# Patient Record
Sex: FEMALE | Race: WHITE | NOT HISPANIC OR LATINO | Employment: FULL TIME | URBAN - METROPOLITAN AREA
[De-identification: names, ages, dates, MRNs, and addresses within clinical notes are randomized per-mention and may not be internally consistent; named-entity substitution may affect disease eponyms.]

---

## 2017-02-21 DIAGNOSIS — Z76.0 ENCOUNTER FOR MEDICATION REFILL: ICD-10-CM

## 2017-02-22 RX ORDER — ALPRAZOLAM 0.5 MG
TABLET ORAL
Qty: 90 TABLET | Refills: 0 | OUTPATIENT
Start: 2017-02-22

## 2017-03-09 ENCOUNTER — OFFICE VISIT (OUTPATIENT)
Dept: FAMILY MEDICINE | Facility: CLINIC | Age: 57
End: 2017-03-09

## 2017-03-09 VITALS
SYSTOLIC BLOOD PRESSURE: 130 MMHG | BODY MASS INDEX: 23.57 KG/M2 | HEART RATE: 87 BPM | OXYGEN SATURATION: 99 % | DIASTOLIC BLOOD PRESSURE: 68 MMHG | WEIGHT: 146 LBS

## 2017-03-09 DIAGNOSIS — E78.00 HYPERCHOLESTEREMIA: ICD-10-CM

## 2017-03-09 DIAGNOSIS — F41.9 ANXIETY: Primary | ICD-10-CM

## 2017-03-09 PROCEDURE — 99213 OFFICE O/P EST LOW 20 MIN: CPT | Performed by: FAMILY MEDICINE

## 2017-03-09 RX ORDER — ALPRAZOLAM 0.5 MG
TABLET ORAL
Qty: 90 TABLET | Refills: 5 | Status: SHIPPED | OUTPATIENT
Start: 2017-03-09 | End: 2017-11-19

## 2017-03-09 NOTE — PROGRESS NOTES
Been dealing with lots of anxiety over many years.  Needs refill to continue pretty stable lately    >15 min spent with patient, greater than 50% spent on discussion/education/planning, etc about The primary encounter diagnosis was Anxiety. A diagnosis of Hypercholesteremia was also pertinent to this visit.   JOAN

## 2017-03-09 NOTE — MR AVS SNAPSHOT
"              After Visit Summary   3/9/2017    Lisbeth Griffin    MRN: [id number]           Patient Information     Date Of Birth          1960        Visit Information        Provider Department      3/9/2017 12:00 PM Jean Horowitz MD [facility]        Today's Diagnoses     Anxiety    -  1    Hypercholesteremia           Follow-ups after your visit        Who to contact     If you have questions or need follow up information about today's clinic visit or your schedule please contact [facility] directly at [phone].  Normal or non-critical lab and imaging results will be communicated to you by InfraSearchhart, letter or phone within 4 business days after the clinic has received the results. If you do not hear from us within 7 days, please contact the clinic through Happiest Mindst or phone. If you have a critical or abnormal lab result, we will notify you by phone as soon as possible.  Submit refill requests through Caringo or call your pharmacy and they will forward the refill request to us. Please allow 3 business days for your refill to be completed.          Additional Information About Your Visit        MyChart Information     Caringo lets you send messages to your doctor, view your test results, renew your prescriptions, schedule appointments and more. To sign up, go to [email or web address] . Click on \"Log in\" on the left side of the screen, which will take you to the Welcome page. Then click on \"Sign up Now\" on the right side of the page.     You will be asked to enter the access code listed below, as well as some personal information. Please follow the directions to create your username and password.     Your access code is: [email or web address]  Expires: 2017  6:05 PM     Your access code will  in 90 days. If you need help or a new code, please call your [facility] or [phone].        Care EveryWhere ID     This is your Care EveryWhere ID. This could be " used by other organizations to access your [facility] medical records  [id number]        Your Vitals Were     Pulse Pulse Oximetry BMI (Body Mass Index)             87 99% 23.57 kg/m2          Blood Pressure from Last 3 Encounters:   03/09/17 130/68   11/30/16 133/73   02/23/16 118/70    Weight from Last 3 Encounters:   03/09/17 66.2 kg (146 lb)   11/30/16 62.1 kg (137 lb)   02/23/16 67.1 kg (148 lb)              Today, you had the following     No orders found for display         Where to get your medicines      Some of these will need a paper prescription and others can be bought over the counter.  Ask your nurse if you have questions.     Bring a paper prescription for each of these medications     ALPRAZolam 0.5 MG tablet          Primary Care Provider Office Phone # Fax #    Jean Horowitz [phone]       [facility] [address] [place] [address]        Thank you!     Thank you for choosing [facility]  for your care. Our goal is always to provide you with excellent care. Hearing back from our patients is one way we can continue to improve our services. Please take a few minutes to complete the written survey that you may receive in the mail after your visit with us. Thank you!             Your Updated Medication List - Protect others around you: Learn how to safely use, store and throw away your medicines at www.disposemymeds.org.          This list is accurate as of: 3/9/17 11:59 PM.  Always use your most recent med list.                   Brand Name Dispense Instructions for use    acyclovir 400 MG tablet    ZOVIRAX    180 tablet    Take 1 tablet (400 mg) by mouth 2 times daily       ALPRAZolam 0.5 MG tablet    XANAX    90 tablet    TAKE ONE TABLET BY MOUTH 3 TIMES DAILY AS NEEDED  FOR ANXIETY       imipramine 50 MG tablet    TOFRANIL    180 tablet    TAKE 2 TABLETS BY MOUTH  EVERY NIGHT AT BEDTIME       progesterone (in olive oil) 100 MG/ML CMPD  injection      Take 150 mg by mouth daily Pt says this is a pill not injection. Taking at night 150 mg

## 2017-03-10 ASSESSMENT — PATIENT HEALTH QUESTIONNAIRE - PHQ9: SUM OF ALL RESPONSES TO PHQ QUESTIONS 1-9: 2

## 2017-05-08 ENCOUNTER — HOSPITAL ENCOUNTER (OUTPATIENT)
Dept: MAMMOGRAPHY | Facility: CLINIC | Age: 57
Discharge: HOME | End: 2017-05-08
Attending: FAMILY MEDICINE | Admitting: FAMILY MEDICINE
Payer: COMMERCIAL

## 2017-05-08 DIAGNOSIS — Z12.31 VISIT FOR SCREENING MAMMOGRAM: ICD-10-CM

## 2017-05-08 PROCEDURE — 77063 BREAST TOMOSYNTHESIS BI: CPT

## 2017-05-22 DIAGNOSIS — Z76.0 ENCOUNTER FOR MEDICATION REFILL: ICD-10-CM

## 2017-05-22 RX ORDER — ACYCLOVIR 400 MG/1
400 TABLET ORAL 2 TIMES DAILY
Qty: 180 TABLET | Refills: 3 | Status: SHIPPED | OUTPATIENT
Start: 2017-05-22 | End: 2018-07-30

## 2017-08-21 ENCOUNTER — OFFICE VISIT (OUTPATIENT)
Dept: FAMILY MEDICINE | Facility: CLINIC | Age: 57
End: 2017-08-21

## 2017-08-21 VITALS
TEMPERATURE: 98.6 F | HEART RATE: 79 BPM | HEIGHT: 66 IN | OXYGEN SATURATION: 99 % | SYSTOLIC BLOOD PRESSURE: 102 MMHG | DIASTOLIC BLOOD PRESSURE: 70 MMHG | BODY MASS INDEX: 22.66 KG/M2 | WEIGHT: 141 LBS

## 2017-08-21 DIAGNOSIS — N64.4 NIPPLE PAIN: ICD-10-CM

## 2017-08-21 DIAGNOSIS — Z11.59 ENCOUNTER FOR HEPATITIS C SCREENING TEST FOR LOW RISK PATIENT: ICD-10-CM

## 2017-08-21 DIAGNOSIS — Z13.220 LIPID SCREENING: ICD-10-CM

## 2017-08-21 DIAGNOSIS — Z80.0 FAMILY HISTORY OF LYNCH SYNDROME: ICD-10-CM

## 2017-08-21 DIAGNOSIS — A60.04 HERPES SIMPLEX VULVOVAGINITIS: ICD-10-CM

## 2017-08-21 DIAGNOSIS — M21.612 BUNION, LEFT: ICD-10-CM

## 2017-08-21 DIAGNOSIS — Z80.0 FAMILY HISTORY OF RECTAL CANCER: ICD-10-CM

## 2017-08-21 DIAGNOSIS — Z00.00 ROUTINE GENERAL MEDICAL EXAMINATION AT A HEALTH CARE FACILITY: Primary | ICD-10-CM

## 2017-08-21 LAB
% GRANULOCYTES: 65 % (ref 42.2–75.2)
BACTERIA URINE: ABNORMAL
BILIRUB UR QL STRIP: 0
BLOOD URINE DIP: ABNORMAL
CASTS/LPF: 0
COLOR UR: YELLOW
CRYSTAL URINE: 0
EPITHELIAL CELLS - QUEST: ABNORMAL
GLUCOSE UR STRIP-MCNC: 0 MG/DL
HCT VFR BLD AUTO: 43 % (ref 35–46)
HEMOGLOBIN: 14.2 G/DL (ref 11.8–15.5)
KETONES UR QL STRIP: ABNORMAL
LEUKOCYTE ESTERASE URINE DIP: 0
LYMPHOCYTES NFR BLD AUTO: 28.5 % (ref 20.5–51.1)
MCH RBC QN AUTO: 30.7 PG (ref 27–31)
MCHC RBC AUTO-ENTMCNC: 33 G/DL (ref 33–37)
MCV RBC AUTO: 92.8 FL (ref 80–100)
MONOCYTES NFR BLD AUTO: 6.5 % (ref 1.7–9.3)
MUCOUS URINE: 0
NITRITE UR QL STRIP: ABNORMAL
PH UR STRIP: 5.5 PH (ref 5–9)
PLATELET # BLD AUTO: 277 K/UL (ref 140–450)
PROT UR QL: ABNORMAL MG/DL (ref ?–0.01)
RBC # BLD AUTO: 4.63 X10/CMM (ref 3.7–5.2)
RBC URINE: ABNORMAL (ref 0–3)
SP GR UR STRIP: 1.02 (ref 1–1.02)
UROBILINOGEN UR QL STRIP: 0.2 EU/DL (ref 0.2–1)
WBC # BLD AUTO: 5.6 X10/CMM (ref 3.8–11)
WBC URINE: 0 (ref 0–3)

## 2017-08-21 PROCEDURE — 85025 COMPLETE CBC W/AUTO DIFF WBC: CPT | Performed by: FAMILY MEDICINE

## 2017-08-21 PROCEDURE — 99396 PREV VISIT EST AGE 40-64: CPT | Performed by: FAMILY MEDICINE

## 2017-08-21 PROCEDURE — 86803 HEPATITIS C AB TEST: CPT | Mod: 90 | Performed by: FAMILY MEDICINE

## 2017-08-21 PROCEDURE — 80053 COMPREHEN METABOLIC PANEL: CPT | Mod: 90 | Performed by: FAMILY MEDICINE

## 2017-08-21 PROCEDURE — 81003 URINALYSIS AUTO W/O SCOPE: CPT | Performed by: FAMILY MEDICINE

## 2017-08-21 PROCEDURE — 36415 COLL VENOUS BLD VENIPUNCTURE: CPT | Performed by: FAMILY MEDICINE

## 2017-08-21 PROCEDURE — 80061 LIPID PANEL: CPT | Mod: 90 | Performed by: FAMILY MEDICINE

## 2017-08-21 RX ORDER — MULTIPLE VITAMINS W/ MINERALS TAB 9MG-400MCG
1 TAB ORAL DAILY
COMMUNITY

## 2017-08-21 ASSESSMENT — PATIENT HEALTH QUESTIONNAIRE - PHQ9: SUM OF ALL RESPONSES TO PHQ QUESTIONS 1-9: 8

## 2017-08-21 NOTE — PROGRESS NOTES
SUBJECTIVE:   CC: Lisbeth Griffin is an 56 year old woman who presents for preventive health visit.     Healthy Habits:    Do you get at least three servings of calcium containing foods daily (dairy, green leafy vegetables, etc.)? yes and no, taking calcium and/or vitamin D supplement    Amount of exercise or daily activities, outside of work: N/A    Problems taking medications regularly No    Medication side effects: No    Have you had an eye exam in the past two years? yes    Do you see a dentist twice per year? yes    Do you have sleep apnea, excessive snoring or daytime drowsiness?yes, snoring     Mammogram done on this date: 2017 (approximately), by this group: [facility], results were Normal.   Pap smear done on this date: 2016 (approximately), by this group: [facility], results were Normal.           CONCERNS  Acid reflux     Today's PHQ-2 Score:   PHQ-2 ( 1999 Pfizer) 8/21/2017 2/23/2016   Q1: Little interest or pleasure in doing things 1 0   Q2: Feeling down, depressed or hopeless 1 0   PHQ-2 Score 2 0         Abuse: Current or Past(Physical, Sexual or Emotional)- No  Do you feel safe in your environment - Yes  Social History   Substance Use Topics     Smoking status: Never Smoker     Smokeless tobacco: Never Used     Alcohol use Yes      Comment: 7 per week     The patient does not drink >3 drinks per day nor >7 drinks per week.    Reviewed orders with patient.  Reviewed health maintenance and updated orders accordingly - Yes  BP Readings from Last 3 Encounters:   08/21/17 102/70   03/09/17 130/68   11/30/16 133/73    Wt Readings from Last 3 Encounters:   08/21/17 64 kg (141 lb)   03/09/17 66.2 kg (146 lb)   11/30/16 62.1 kg (137 lb)                  Patient Active Problem List   Diagnosis     Anxiety     Acid indigestion     History of migraine     Family history of rectal cancer     Genital herpes     Family history of early CAD     Past Surgical History:   Procedure Laterality Date      COLONOSCOPY  11/21/2012    Procedure: COLONOSCOPY;;  Surgeon: Yamilex Baron MD;  Location:  GI     COLONOSCOPY N/A 11/26/2014    Procedure: COLONOSCOPY;  Surgeon: Yamilex Baron MD;  Location:  GI     COLONOSCOPY N/A 11/30/2016    Procedure: COMBINED COLONOSCOPY, SINGLE OR MULTIPLE BIOPSY/POLYPECTOMY BY BIOPSY;  Surgeon: Yamilex Baron MD;  Location:  GI     MAMMOPLASTY AUGMENTATION       uterine ablation         Social History   Substance Use Topics     Smoking status: Never Smoker     Smokeless tobacco: Never Used     Alcohol use Yes      Comment: 7 per week     Family History   Problem Relation Age of Onset     C.A.D. Maternal Grandfather 65     Colon Polyps Brother      Colon Cancer Sister      Orellana syndrome               Patient over age 50, mutual decision to screen reflected in health maintenance.      Pertinent mammograms are reviewed under the imaging tab.  History of abnormal Pap smear: NO - age 30- 65 PAP every 3 years recommended    Reviewed and updated as needed this visit by clinical staffTobacco  Allergies  Meds         Reviewed and updated as needed this visit by Provider        Past Medical History:   Diagnosis Date     Acid indigestion     Small hiatal hernia     Anxiety      Family history of Orellana syndrome     In her sister.  EGD unremarkable with the exception of a small hiatal hernia 2016     Genital herpes, unspecified      History of migraines       Past Surgical History:   Procedure Laterality Date     COLONOSCOPY  11/21/2012    Procedure: COLONOSCOPY;;  Surgeon: Yamilex Baron MD;  Location:  GI     COLONOSCOPY N/A 11/26/2014    Procedure: COLONOSCOPY;  Surgeon: Yamilex Baron MD;  Location:  GI     COLONOSCOPY N/A 11/30/2016    Procedure: COMBINED COLONOSCOPY, SINGLE OR MULTIPLE BIOPSY/POLYPECTOMY BY BIOPSY;  Surgeon: Yamilex Baron MD;  Location:  GI     MAMMOPLASTY AUGMENTATION       uterine ablation         ROS:  C: NEGATIVE for  "fever, chills, change in weight  I: NEGATIVE for worrisome rashes, moles or lesions  E: NEGATIVE for vision changes or irritation  ENT: NEGATIVE for ear, mouth and throat problems  R: NEGATIVE for significant cough or SOB  B: NEGATIVE for masses, tenderness or discharge  CV: NEGATIVE for chest pain, palpitations or peripheral edema  GI: NEGATIVE for nausea, abdominal pain, heartburn, or change in bowel habits  : NEGATIVE for unusual urinary or vaginal symptoms. No vaginal bleeding.  M: NEGATIVE for significant arthralgias or myalgia  N: NEGATIVE for weakness, dizziness or paresthesias  P: NEGATIVE for changes in mood or affect     OBJECTIVE:   /70  Pulse 79  Temp 98.6  F (37  C)  Ht 1.67 m (5' 5.75\")  Wt 64 kg (141 lb)  SpO2 99%  BMI 22.93 kg/m2  EXAM:  GENERAL APPEARANCE: healthy, alert and no distress  EYES: Eyes grossly normal to inspection, PERRL and conjunctivae and sclerae normal  HENT: ear canals and TM's normal, nose and mouth without ulcers or lesions, oropharynx clear and oral mucous membranes moist  NECK: no adenopathy, no asymmetry, masses, or scars and thyroid normal to palpation  RESP: lungs clear to auscultation - no rales, rhonchi or wheezes  BREAST: normal without masses, tenderness or nipple discharge and no palpable axillary masses or adenopathy  CV: regular rate and rhythm, normal S1 S2, no S3 or S4, no murmur, click or rub, no peripheral edema and peripheral pulses strong  ABDOMEN: soft, nontender, no hepatosplenomegaly, no masses and bowel sounds normal   (female): normal female external genitalia, normal urethral meatus, vaginal mucosal atrophy noted, normal cervix, adnexae, and uterus without masses or abnormal discharge  MS: no musculoskeletal defects are noted and gait is age appropriate without ataxia  SKIN: no suspicious lesions or rashes  NEURO: Normal strength and tone, sensory exam grossly normal, mentation intact and speech normal  PSYCH: mentation appears normal and " "affect normal/bright    ASSESSMENT/PLAN:   Lisbeth was seen today for physical and hearing screening.    Diagnoses and all orders for this visit:    Routine general medical examination at a health care facility  -     Comp. Metabolic Panel (14) ([facility])    Family history of rectal cancer    Herpes simplex vulvovaginitis    Nipple pain  -     CBC with Diff/Plt ([facility])    Bunion, left    Lipid screening  -     Lipid Panel ([facility])    Encounter for hepatitis C screening test for low risk patient  -     HCV Antibody ([facility])        COUNSELING:   Reviewed preventive health counseling, as reflected in patient instructions       Vision screening       Hearing screening    BP Screening:   Last 3 BP Readings:    BP Readings from Last 3 Encounters:   08/21/17 102/70   03/09/17 130/68   11/30/16 133/73     At this point she is not interested in being tested for Orellana syndrome though she is getting q2 year colonscopies  Her sister is positive.  Bimanual and breast exam is nrl.    The following was recommended to the patient:       reports that she has never smoked. She has never used smokeless tobacco.    Estimated body mass index is 22.93 kg/(m^2) as calculated from the following:    Height as of this encounter: 1.67 m (5' 5.75\").    Weight as of this encounter: 64 kg (141 lb).         Counseling Resources:  ATP IV Guidelines  Pooled Cohorts Equation Calculator  Breast Cancer Risk Calculator  FRAX Risk Assessment  ICSI Preventive Guidelines  Dietary Guidelines for Americans, 2010  USDA's MyPlate  ASA Prophylaxis  Lung CA Screening    Jean Horowitz MD  [facility]  "

## 2017-08-21 NOTE — LETTER
[facility] [address] [place]  [address]  Phone: [phone]    August 22, 2017      Lisbethsammie Dietz Roderick Griffin  [facility] [facility] [address]              Dear Lisbeth,    I am writing to report that your included test results are within expected ranges. I do not suggest that we make any changes at this time.          Sincerely,     Jean Horowitz M.D.    Results for orders placed or performed in visit on 08/21/17   Comp. Metabolic Panel (14) ([facility])   Result Value Ref Range    Glucose 111 (H) 65 - 99 mg/dL    Urea Nitrogen 16 6 - 24 mg/dL    Creatinine 1.07 (H) 0.57 - 1.00 mg/dL    eGFR If NonAfricn Am 58 (L) >59 mL/min/1.73    eGFR If Africn Am 67 >59 mL/min/1.73    BUN/Creatinine Ratio 15 9 - 23    Sodium 142 134 - 144 mmol/L    Potassium 4.3 3.5 - 5.2 mmol/L    Chloride 100 96 - 106 mmol/L    Total CO2 26 18 - 28 mmol/L    Calcium 9.6 8.7 - 10.2 mg/dL    Protein Total 7.8 6.0 - 8.5 g/dL    Albumin 4.8 3.5 - 5.5 g/dL    Globulin, Total 3.0 1.5 - 4.5 g/dL    A/G Ratio 1.6 1.2 - 2.2    Bilirubin Total 1.2 0.0 - 1.2 mg/dL    Alkaline Phosphatase 72 39 - 117 IU/L    AST 22 0 - 40 IU/L    ALT 17 0 - 32 IU/L    Narrative    Performed at:  01 - [facility] [address], [place], [place]  [id number]  : Jacob Maravilla MD, Phone:  [phone]   Lipid Panel ([facility])   Result Value Ref Range    Cholesterol 232 (H) 100 - 199 mg/dL    Triglycerides 79 0 - 149 mg/dL    HDL Cholesterol 54 >39 mg/dL    VLDL Cholesterol Luis 16 5 - 40 mg/dL    LDL Cholesterol Calculated 162 (H) 0 - 99 mg/dL    LDL/HDL Ratio 3.0 0.0 - 3.2 ratio units    Narrative    Performed at:  01 - [facility] [address], [place], [place]  [id number]  : Jacob Maravilla MD, Phone:  [phone]   CBC with Diff/Plt ([facility])   Result Value Ref Range    WBC x10/cmm 5.6 3.8 - 11.0 x10/cmm    % Lymphocytes 28.5 20.5 - 51.1 %    % Monocytes 6.5 1.7 - 9.3 %    % Granulocytes 65.0 42.2 - 75.2  %    RBC x10/cmm 4.63 3.7 - 5.2 x10/cmm    Hemoglobin 14.2 11.8 - 15.5 g/dl    Hematocrit 43.0 35 - 46 %    MCV 92.8 80 - 100 fL    MCH 30.7 27.0 - 31.0 pg    MCHC 33.0 33.0 - 37.0 g/dL    Platelet Count 277 140 - 450 K/uL   HCV Antibody ([facility])   Result Value Ref Range    Hep C Virus Ab <0.1 0.0 - 0.9 s/co ratio    Narrative    Performed at:  01 - [facility] [address], [place], [place]  [id number]  : Jacob Maravilla MD, Phone:  [phone]   Urinalysis w/reflex protein, bili ([facility])   Result Value Ref Range    Color Urine Yellow     pH Urine 5.5 5 - 9 pH    Specific Gravity Urine 1.025 1.005 - 1.025    Protein Urine Trace (A) 0.01 mg/dL    Glucose Urine 0     Ketones Urine 1+ (A)     Leukocyte Esterase Urine 0     Blood Urine Trace (A)     Nitrite Urine Neg NEG    Bilirubin Urine Dip 0     Urobilinogen Urine 0.2 0.2 - 1.0 EU/dL    WBC Urine 0 0 - 3    RBC Urine 0-3 0 - 3    Epithelial Cells Rare     Crystal Urine 0     Bacteria Urine Few     Mucous Urine 0     Casts/LPF 0

## 2017-08-21 NOTE — MR AVS SNAPSHOT
After Visit Summary   8/21/2017    Lisbeth Griffin    MRN: [id number]           Patient Information     Date Of Birth          1960        Visit Information        Provider Department      8/21/2017 9:00 AM Jean Horowitz MD [facility]        Today's Diagnoses     Routine general medical examination at a health care facility    -  1      Care Instructions      Preventive Health Recommendations  Female Ages 50 - 64    Yearly exam: See your health care provider every year in order to  o Review health changes.   o Discuss preventive care.    o Review your medicines if your doctor has prescribed any.      Get a Pap test every three years (unless you have an abnormal result and your provider advises testing more often).    If you get Pap tests with HPV test, you only need to test every 5 years, unless you have an abnormal result.     You do not need a Pap test if your uterus was removed (hysterectomy) and you have not had cancer.    You should be tested each year for STDs (sexually transmitted diseases) if you're at risk.     Have a mammogram every 1 to 2 years.    Have a colonoscopy at age 50, or have a yearly FIT test (stool test). These exams screen for colon cancer.      Have a cholesterol test every 5 years, or more often if advised.    Have a diabetes test (fasting glucose) every three years. If you are at risk for diabetes, you should have this test more often.     If you are at risk for osteoporosis (brittle bone disease), think about having a bone density scan (DEXA).    Shots: Get a flu shot each year. Get a tetanus shot every 10 years.    Nutrition:     Eat at least 5 servings of fruits and vegetables each day.    Eat whole-grain bread, whole-wheat pasta and brown rice instead of white grains and rice.    Talk to your provider about Calcium and Vitamin D.     Lifestyle    Exercise at least 150 minutes a week (30 minutes a day, 5 days a week). This will help you  "control your weight and prevent disease.    Limit alcohol to one drink per day.    No smoking.     Wear sunscreen to prevent skin cancer.     See your dentist every six months for an exam and cleaning.    See your eye doctor every 1 to 2 years.            Follow-ups after your visit        Who to contact     If you have questions or need follow up information about today's clinic visit or your schedule please contact [facility] directly at [phone].  Normal or non-critical lab and imaging results will be communicated to you by Artax Biopharmahart, letter or phone within 4 business days after the clinic has received the results. If you do not hear from us within 7 days, please contact the clinic through Nalat or phone. If you have a critical or abnormal lab result, we will notify you by phone as soon as possible.  Submit refill requests through PredictAd or call your pharmacy and they will forward the refill request to us. Please allow 3 business days for your refill to be completed.          Additional Information About Your Visit        PredictAd Information     PredictAd lets you send messages to your doctor, view your test results, renew your prescriptions, schedule appointments and more. To sign up, go to [email or web address] . Click on \"Log in\" on the left side of the screen, which will take you to the Welcome page. Then click on \"Sign up Now\" on the right side of the page.     You will be asked to enter the access code listed below, as well as some personal information. Please follow the directions to create your username and password.     Your access code is: [email or web address]  Expires: 2017  9:48 AM     Your access code will  in 90 days. If you need help or a new code, please call your [facility] clinic or [phone].        Care EveryWhere ID     This is your Care EveryWhere ID. This could be used by other organizations to access your [facility] medical records  [id number]        Your Vitals Were " "    Pulse Temperature Height Pulse Oximetry BMI (Body Mass Index)       79 98.6  F (37  C) 1.67 m (5' 5.75\") 99% 22.93 kg/m2        Blood Pressure from Last 3 Encounters:   08/21/17 102/70   03/09/17 130/68   11/30/16 133/73    Weight from Last 3 Encounters:   08/21/17 64 kg (141 lb)   03/09/17 66.2 kg (146 lb)   11/30/16 62.1 kg (137 lb)              Today, you had the following     No orders found for display       Primary Care Provider Office Phone # Fax #    Jean Horowitz [phone] [address]  [facility] [address]        Equal Access to Services     SUE STEARNS : Hadii jah leeo Somarc, waaxda luqadaha, qaybta kaalmada adeegyada, waxsanjuanita west hayradha rios . So [facility] [phone].    ATENCIÓN: Si habla español, tiene a acosta disposición servicios gratuitos de asistencia lingüística. Llame al [phone].    We comply with applicable federal civil rights laws and [place] laws. We do not discriminate on the basis of race, color, national origin, age, disability sex, sexual orientation or gender identity.            Thank you!     Thank you for choosing [facility]  for your care. Our goal is always to provide you with excellent care. Hearing back from our patients is one way we can continue to improve our services. Please take a few minutes to complete the written survey that you may receive in the mail after your visit with us. Thank you!             Your Updated Medication List - Protect others around you: Learn how to safely use, store and throw away your medicines at www.disposemymeds.org.          This list is accurate as of: 8/21/17  9:48 AM.  Always use your most recent med list.                   Brand Name Dispense Instructions for use Diagnosis    acyclovir 400 MG tablet    ZOVIRAX    180 tablet    Take 1 tablet (400 mg) by mouth 2 times daily    Encounter for medication refill       ALPRAZolam 0.5 MG tablet    XANAX    90 tablet "    TAKE ONE TABLET BY MOUTH 3 TIMES DAILY AS NEEDED  FOR ANXIETY    Anxiety       imipramine 50 MG tablet    TOFRANIL    180 tablet    TAKE 2 TABLETS BY MOUTH  EVERY NIGHT AT BEDTIME    Encounter for medication refill       Multi-vitamin Tabs tablet      Take 1 tablet by mouth daily        progesterone (in olive oil) 100 MG/ML CMPD injection      Take 150 mg by mouth daily Pt says this is a pill not injection. Taking at night 150 mg        UNABLE TO FIND      MEDICATION NAME: Vanecal

## 2017-08-22 LAB
ALBUMIN SERPL-MCNC: 4.8 G/DL (ref 3.5–5.5)
ALBUMIN/GLOB SERPL: 1.6 {RATIO} (ref 1.2–2.2)
ALP SERPL-CCNC: 72 IU/L (ref 39–117)
ALT SERPL-CCNC: 17 IU/L (ref 0–32)
AST SERPL-CCNC: 22 IU/L (ref 0–40)
BILIRUB SERPL-MCNC: 1.2 MG/DL (ref 0–1.2)
BUN SERPL-MCNC: 16 MG/DL (ref 6–24)
BUN/CREATININE RATIO: 15 (ref 9–23)
CALCIUM SERPL-MCNC: 9.6 MG/DL (ref 8.7–10.2)
CHLORIDE SERPLBLD-SCNC: 100 MMOL/L (ref 96–106)
CHOLEST SERPL-MCNC: 232 MG/DL (ref 100–199)
CREAT SERPL-MCNC: 1.07 MG/DL (ref 0.57–1)
EGFR IF AFRICN AM: 67 ML/MIN/1.73
EGFR IF NONAFRICN AM: 58 ML/MIN/1.73
GLOBULIN, TOTAL: 3 G/DL (ref 1.5–4.5)
GLUCOSE SERPL-MCNC: 111 MG/DL (ref 65–99)
HCV AB SERPL QL IA: <0.1 S/CO RATIO (ref 0–0.9)
HDLC SERPL-MCNC: 54 MG/DL
LDL/HDL RATIO: 3 RATIO UNITS (ref 0–3.2)
LDLC SERPL CALC-MCNC: 162 MG/DL (ref 0–99)
POTASSIUM SERPL-SCNC: 4.3 MMOL/L (ref 3.5–5.2)
PROT SERPL-MCNC: 7.8 G/DL (ref 6–8.5)
SODIUM SERPL-SCNC: 142 MMOL/L (ref 134–144)
TOTAL CO2: 26 MMOL/L (ref 18–28)
TRIGL SERPL-MCNC: 79 MG/DL (ref 0–149)
VLDLC SERPL CALC-MCNC: 16 MG/DL (ref 5–40)

## 2017-08-22 NOTE — PROGRESS NOTES
Dear Lisbeth,   I am writing to report that your included test results are within expected ranges. I do not suggest that we make any changes at this time.    Jean Horowitz M.D.

## 2017-10-13 ENCOUNTER — OFFICE VISIT (OUTPATIENT)
Dept: FAMILY MEDICINE | Facility: CLINIC | Age: 57
End: 2017-10-13

## 2017-10-13 VITALS
DIASTOLIC BLOOD PRESSURE: 80 MMHG | OXYGEN SATURATION: 99 % | HEIGHT: 65 IN | WEIGHT: 148 LBS | SYSTOLIC BLOOD PRESSURE: 118 MMHG | HEART RATE: 78 BPM | TEMPERATURE: 98.8 F | BODY MASS INDEX: 24.66 KG/M2

## 2017-10-13 DIAGNOSIS — Z01.818 PREOPERATIVE EXAMINATION: Primary | ICD-10-CM

## 2017-10-13 LAB
% GRANULOCYTES: 54.5 % (ref 42.2–75.2)
HCT VFR BLD AUTO: 40.3 % (ref 35–46)
HEMOGLOBIN: 13.6 G/DL (ref 11.8–15.5)
LYMPHOCYTES NFR BLD AUTO: 37.7 % (ref 20.5–51.1)
MCH RBC QN AUTO: 31.9 PG (ref 27–31)
MCHC RBC AUTO-ENTMCNC: 33.9 G/DL (ref 33–37)
MCV RBC AUTO: 94 FL (ref 80–100)
MONOCYTES NFR BLD AUTO: 7.8 % (ref 1.7–9.3)
PLATELET # BLD AUTO: 273 K/UL (ref 140–450)
RBC # BLD AUTO: 4.28 X10/CMM (ref 3.7–5.2)
WBC # BLD AUTO: 5.2 X10/CMM (ref 3.8–11)

## 2017-10-13 PROCEDURE — 36415 COLL VENOUS BLD VENIPUNCTURE: CPT | Performed by: FAMILY MEDICINE

## 2017-10-13 PROCEDURE — 85025 COMPLETE CBC W/AUTO DIFF WBC: CPT | Performed by: FAMILY MEDICINE

## 2017-10-13 PROCEDURE — 99215 OFFICE O/P EST HI 40 MIN: CPT | Performed by: FAMILY MEDICINE

## 2017-10-13 NOTE — MR AVS SNAPSHOT
"              After Visit Summary   10/13/2017    Lisbeth Griffin    MRN: [id number]           Patient Information     Date Of Birth          1960        Visit Information        Provider Department      10/13/2017 3:15 PM Mirtha Trejo MD [facility]        Today's Diagnoses     Preoperative examination    -  1       Follow-ups after your visit        Who to contact     If you have questions or need follow up information about today's clinic visit or your schedule please contact [facility] directly at [phone].  Normal or non-critical lab and imaging results will be communicated to you by Admittedlyhart, letter or phone within 4 business days after the clinic has received the results. If you do not hear from us within 7 days, please contact the clinic through [employer] or phone. If you have a critical or abnormal lab result, we will notify you by phone as soon as possible.  Submit refill requests through Powerlytics or call your pharmacy and they will forward the refill request to us. Please allow 3 business days for your refill to be completed.          Additional Information About Your Visit        MyChart Information     Powerlytics lets you send messages to your doctor, view your test results, renew your prescriptions, schedule appointments and more. To sign up, go to [email or web address] . Click on \"Log in\" on the left side of the screen, which will take you to the Welcome page. Then click on \"Sign up Now\" on the right side of the page.     You will be asked to enter the access code listed below, as well as some personal information. Please follow the directions to create your username and password.     Your access code is: [email or web address]  Expires: 2017  9:48 AM     Your access code will  in 90 days. If you need help or a new code, please call your [place] clinic or [phone].        Care EveryWhere ID     This is your Care EveryWhere ID. This could be " "used by other organizations to access your [place] medical records  [id number]        Your Vitals Were     Pulse Temperature Height Pulse Oximetry BMI (Body Mass Index)       78 98.8  F (37.1  C) 1.657 m (5' 5.25\") 99% 24.44 kg/m2        Blood Pressure from Last 3 Encounters:   10/13/17 118/80   08/21/17 102/70   03/09/17 130/68    Weight from Last 3 Encounters:   10/13/17 67.1 kg (148 lb)   08/21/17 64 kg (141 lb)   03/09/17 66.2 kg (146 lb)              We Performed the Following     CBC with Diff/Plt ([facility])        Primary Care Provider Office Phone # Fax #    Jean Horowitz [phone] [address]  [facility] [address]        Equal Access to Services     [facility]: Hadii aad ku hadasho Soomaali, waaxda luqadaha, qaybta kaalmada adeegyada, waxay cristiin hayalissan yazmin rios . So [facility] [phone].    ATENCIÓN: Si habla español, tiene a acosta disposición servicios gratuitos de asistencia lingüística. Luzmaheath al [phone].    We comply with applicable federal civil rights laws and [place] laws. We do not discriminate on the basis of race, color, national origin, age, disability, sex, sexual orientation, or gender identity.            Thank you!     Thank you for choosing [facility]  for your care. Our goal is always to provide you with excellent care. Hearing back from our patients is one way we can continue to improve our services. Please take a few minutes to complete the written survey that you may receive in the mail after your visit with us. Thank you!             Your Updated Medication List - Protect others around you: Learn how to safely use, store and throw away your medicines at www.disposemymeds.org.          This list is accurate as of: 10/13/17  3:58 PM.  Always use your most recent med list.                   Brand Name Dispense Instructions for use Diagnosis    acyclovir 400 MG tablet    ZOVIRAX    180 tablet    Take 1 tablet (400 mg) by " mouth 2 times daily    Encounter for medication refill       ALPRAZolam 0.5 MG tablet    XANAX    90 tablet    TAKE ONE TABLET BY MOUTH 3 TIMES DAILY AS NEEDED  FOR ANXIETY    Anxiety       imipramine 50 MG tablet    TOFRANIL    180 tablet    TAKE 2 TABLETS BY MOUTH  EVERY NIGHT AT BEDTIME    Encounter for medication refill       Multi-vitamin Tabs tablet      Take 1 tablet by mouth daily        progesterone (in olive oil) 100 MG/ML CMPD injection      Take 150 mg by mouth daily Pt says this is a pill not injection. Taking at night 150 mg        UNABLE TO FIND      MEDICATION NAME: Soleisacal

## 2017-10-13 NOTE — PROGRESS NOTES
[facility]  [address] [place] [id number] [phone]  Dept: [phone]    PRE-OP EVALUATION:  Today's date: 10/13/2017    Lisbeth Griffin (: 1960) presents for pre-operative evaluation assessment as requested by Dr. Gael Varghese.  She requires evaluation and anesthesia risk assessment prior to undergoing surgery/procedure for treatment of Nose .  Proposed procedure: Deviated Septum    Date of Surgery/ Procedure: 10/17/2017  Time of Surgery/ Procedure: 1PM  Hospital/Surgical Facility: [facility]  Fax number for surgical facility: [phone]  Primary Physician: Jean Horowitz  Type of Anesthesia Anticipated: to be determined    Patient has a Health Care Directive or Living Will:  NO    1. NO - Do you have a history of heart attack, stroke, stent, bypass or surgery on an artery in the head, neck, heart or legs?  2. YES - DO YOU EVER HAVE ANY PAIN OR DISCOMFORT IN YOUR CHEST? Anxiety, able to do 40 minutes of cardio without any chest discomfort.   3. NO - Do you have a history of  Heart Failure?  4. NO - Are you troubled by shortness of breath when: walking on the level, up a slight hill or at night?  5. NO - Do you currently have a cold, bronchitis or other respiratory infection?  6. NO - Do you have a cough, shortness of breath or wheezing?  7. NO - Do you sometimes get pains in the calves of your legs when you walk?  8. NO - Do you or anyone in your family have previous history of blood clots?  9. NO - Do you or does anyone in your family have a serious bleeding problem such as prolonged bleeding following surgeries or cuts?  10. NO - Have you ever had problems with anemia or been told to take iron pills?  11. YES - HAVE YOU HAD ANY ABNORMAL BLOOD LOSS SUCH AS BLACK, TARRY OR BLOODY STOOLS, OR ABNORMAL VAGINAL BLEEDING? Vaginal bleeding many years ago, post endometrial ablation.   12. NO - Have you ever had a blood transfusion?  13. YES -  HAVE YOU OR ANY OF YOUR RELATIVES EVER HAD PROBLEMS WITH ANESTHESIA? Self; nausea and takes a long to come out of anesthesia.   14. NO - Do you have sleep apnea, excessive snoring or daytime drowsiness?  15. NO - Do you have any prosthetic heart valves?  16. NO - Do you have prosthetic joints?  17. NO - Is there any chance that you may be pregnant?        HPI:                                                      Brief HPI related to upcoming procedure: history of snoring, but without any breathing difficulty, diagnosed with deviated septum.       PMH:  Anxiety: stable on imipramine, using xanax occasionally for anxiety and sleep issues.   Recurrent genital herpes: uses prn acyclovir.    MEDICAL HISTORY:                                                    Patient Active Problem List    Diagnosis Date Noted     Family history of Orellana syndrome 08/21/2017     Priority: Medium     Family history of early CAD 06/18/2014     Priority: Medium     Family history of rectal cancer 04/23/2012     Priority: Medium     Acid indigestion      Priority: Medium     History of migraine      Priority: Medium     (Problem list name updated by automated process. Provider to review and confirm.)       Genital herpes      Priority: Medium     Problem list name updated by automated process. Provider to review       Anxiety      Priority: Medium      Past Medical History:   Diagnosis Date     Acid indigestion     Small hiatal hernia     Anxiety      Family history of Orellana syndrome     In her sister.  EGD unremarkable with the exception of a small hiatal hernia 2016     Genital herpes, unspecified      History of migraines      Past Surgical History:   Procedure Laterality Date     COLONOSCOPY  11/21/2012    Procedure: COLONOSCOPY;;  Surgeon: Yamilex Baron MD;  Location:  GI     COLONOSCOPY N/A 11/26/2014    Procedure: COLONOSCOPY;  Surgeon: Yamilex Baron MD;  Location:  GI     COLONOSCOPY N/A 11/30/2016    Procedure:  COMBINED COLONOSCOPY, SINGLE OR MULTIPLE BIOPSY/POLYPECTOMY BY BIOPSY;  Surgeon: Yamilex Baron MD;  Location:  GI     MAMMOPLASTY AUGMENTATION       uterine ablation       Current Outpatient Prescriptions   Medication Sig Dispense Refill     UNABLE TO FIND MEDICATION NAME: Vivisacal       multivitamin, therapeutic with minerals (MULTI-VITAMIN) TABS tablet Take 1 tablet by mouth daily       acyclovir (ZOVIRAX) 400 MG tablet Take 1 tablet (400 mg) by mouth 2 times daily 180 tablet 3     ALPRAZolam (XANAX) 0.5 MG tablet TAKE ONE TABLET BY MOUTH 3 TIMES DAILY AS NEEDED  FOR ANXIETY 90 tablet 5     imipramine (TOFRANIL) 50 MG tablet TAKE 2 TABLETS BY MOUTH  EVERY NIGHT AT BEDTIME 180 tablet 3     progesterone, in olive oil, 100 MG/ML CMPD injection Take 150 mg by mouth daily Pt says this is a pill not injection. Taking at night 150 mg       OTC products: None, except as noted above    Allergies   Allergen Reactions     Codeine Sulfate       Latex Allergy: NO    Social History   Substance Use Topics     Smoking status: Never Smoker     Smokeless tobacco: Never Used     Alcohol use Yes      Comment: 7 per week     History   Drug Use No       REVIEW OF SYSTEMS:                                                    C: NEGATIVE for fever, chills, change in weight  I: NEGATIVE for worrisome rashes, moles or lesions  E: NEGATIVE for vision changes or irritation  E/M: NEGATIVE for ear, mouth and throat problems  R: NEGATIVE for significant cough or SOB  B: NEGATIVE for masses, tenderness or discharge  CV: NEGATIVE for chest pain, palpitations or peripheral edema  GI: POSITIVE for dyspepsia and associated cough due to reflux, but no shortness of breath or productive sputum  : NEGATIVE for frequency, dysuria, or hematuria  M: NEGATIVE for significant arthralgias or myalgia  N: NEGATIVE for weakness, dizziness or paresthesias  E: NEGATIVE for temperature intolerance, skin/hair changes  H: NEGATIVE for bleeding problems  P:  "NEGATIVE for changes in mood or affect    EXAM:                                                    /80  Pulse 78  Temp 98.8  F (37.1  C)  Ht 1.657 m (5' 5.25\")  Wt 67.1 kg (148 lb)  SpO2 99%  BMI 24.44 kg/m2    GENERAL APPEARANCE: healthy, alert and no distress     EYES: EOMI, PERRL     HENT: ear canals and TM's normal, oropharynx clear and deviated septum noted with smaller opening to right nare     NECK: no adenopathy, no asymmetry, masses, or scars and thyroid normal to palpation     RESP: lungs clear to auscultation - no rales, rhonchi or wheezes     CV: regular rates and rhythm, normal S1 S2, no S3 or S4 and no murmur, click or rub     ABDOMEN:  soft, nontender, no HSM or masses and bowel sounds normal     MS: extremities normal- no gross deformities noted, no evidence of inflammation in joints, FROM in all extremities.     SKIN: no suspicious lesions or rashes     NEURO: Normal strength and tone, sensory exam grossly normal, mentation intact and speech normal     PSYCH: mentation appears normal. and affect normal/bright     LYMPHATICS: No axillary, cervical, or supraclavicular nodes    DIAGNOSTICS:                                                      Labs Resulted Today:   Results for orders placed or performed in visit on 10/13/17   CBC with Diff/Plt ([facility])   Result Value Ref Range    WBC x10/cmm 5.2 3.8 - 11.0 x10/cmm    % Lymphocytes 37.7 20.5 - 51.1 %    % Monocytes 7.8 1.7 - 9.3 %    % Granulocytes 54.5 42.2 - 75.2 %    RBC x10/cmm 4.28 3.7 - 5.2 x10/cmm    Hemoglobin 13.6 11.8 - 15.5 g/dl    Hematocrit 40.3 35 - 46 %    MCV 94.0 80 - 100 fL    MCH 31.9 (A) 27.0 - 31.0 pg    MCHC 33.9 33.0 - 37.0 g/dL    Platelet Count 273 140 - 450 K/uL       Recent Labs   Lab Test  08/21/17   1023 08/21/17 02/23/16   1337   HGB   --   14.2   --    PLT   --   277   --    NA  142   --   142   POTASSIUM  4.3   --   4.7   CR  1.07*   --   0.90        IMPRESSION:                                                  "   Reason for surgery/procedure: snoring and deviated septum  Diagnosis/reason for consult: preoperative clearance    The proposed surgical procedure is considered INTERMEDIATE risk.    REVISED CARDIAC RISK INDEX  The patient has the following serious cardiovascular risks for perioperative complications such as (MI, PE, VFib and 3  AV Block):  No serious cardiac risks  INTERPRETATION: 0 risks: Class I (very low risk - 0.4% complication rate)    The patient has the following additional risks for perioperative complications:  No identified additional risks      ICD-10-CM    1. Preoperative examination Z01.818 CBC with Diff/Plt ([facility])       RECOMMENDATIONS:                                                              APPROVAL GIVEN to proceed with proposed procedure, without further diagnostic evaluation       Signed Electronically by: Mirtha Trejo MD    Copy of this evaluation report is provided to requesting physician.    [place] Preop Guidelines

## 2017-11-19 DIAGNOSIS — F41.9 ANXIETY: ICD-10-CM

## 2017-11-20 DIAGNOSIS — Z76.0 ENCOUNTER FOR MEDICATION REFILL: ICD-10-CM

## 2017-11-20 RX ORDER — ALPRAZOLAM 0.5 MG
TABLET ORAL
Qty: 90 TABLET | Refills: 5 | Status: SHIPPED | OUTPATIENT
Start: 2017-11-20 | End: 2018-07-30

## 2017-11-21 RX ORDER — IMIPRAMINE HCL 50 MG
TABLET ORAL
Qty: 180 TABLET | Refills: 0 | Status: SHIPPED | OUTPATIENT
Start: 2017-11-21 | End: 2018-03-26

## 2018-03-26 ENCOUNTER — OFFICE VISIT (OUTPATIENT)
Dept: FAMILY MEDICINE | Facility: CLINIC | Age: 58
End: 2018-03-26

## 2018-03-26 VITALS
WEIGHT: 141.6 LBS | BODY MASS INDEX: 22.76 KG/M2 | HEART RATE: 92 BPM | DIASTOLIC BLOOD PRESSURE: 58 MMHG | HEIGHT: 66 IN | SYSTOLIC BLOOD PRESSURE: 116 MMHG

## 2018-03-26 DIAGNOSIS — B35.1 DERMATOPHYTOSIS OF NAIL: ICD-10-CM

## 2018-03-26 DIAGNOSIS — F41.9 ANXIETY: Primary | ICD-10-CM

## 2018-03-26 PROCEDURE — 99213 OFFICE O/P EST LOW 20 MIN: CPT | Performed by: FAMILY MEDICINE

## 2018-03-26 RX ORDER — FERROUS SULFATE 325(65) MG
325 TABLET ORAL
COMMUNITY
End: 2023-05-30

## 2018-03-26 RX ORDER — IMIPRAMINE HCL 50 MG
TABLET ORAL
Qty: 180 TABLET | Refills: 3 | Status: SHIPPED | OUTPATIENT
Start: 2018-03-26 | End: 2019-05-13

## 2018-03-26 NOTE — MR AVS SNAPSHOT
"              After Visit Summary   3/26/2018    Lisbeth Griffin    MRN: [id number]           Patient Information     Date Of Birth          1960        Visit Information        Provider Department      3/26/2018 9:30 AM Jean Horowitz MD [facility]        Care Instructions    fu            Follow-ups after your visit        Your next 10 appointments already scheduled     Mar 26, 2018  9:30 AM CDT   SHORT with Jean Horowitz MD   [facility] ([facility])    [address] [place] [address] [phone]            Aug 24, 2018  8:30 AM CDT   PHYSICAL with Jean Horowitz MD   [facility] ([facility])    [address] [place] [address] [phone]              Who to contact     If you have questions or need follow up information about today's clinic visit or your schedule please contact [facility] directly at [phone].  Normal or non-critical lab and imaging results will be communicated to you by Standardized Safetyhart, letter or phone within 4 business days after the clinic has received the results. If you do not hear from us within 7 days, please contact the clinic through [employer] or phone. If you have a critical or abnormal lab result, we will notify you by phone as soon as possible.  Submit refill requests through Parudi or call your pharmacy and they will forward the refill request to us. Please allow 3 business days for your refill to be completed.          Additional Information About Your Visit        Parudi Information     Parudi lets you send messages to your doctor, view your test results, renew your prescriptions, schedule appointments and more. To sign up, go to [email or web address] . Click on \"Log in\" on the left side of the screen, which will take you to the Welcome page. Then click on \"Sign up Now\" on the right side of the page.     You will be asked to enter the " "access code listed below, as well as some personal information. Please follow the directions to create your username and password.     Your access code is: [email or web address]  Expires: 2018  9:20 AM     Your access code will  in 90 days. If you need help or a new code, please call your [place] clinic or [phone].        Care EveryWhere ID     This is your Care EveryWhere ID. This could be used by other organizations to access your [place] medical records  [id number]        Your Vitals Were     Pulse Height BMI (Body Mass Index)             92 1.676 m (5' 6\") 22.85 kg/m2          Blood Pressure from Last 3 Encounters:   18 116/58   10/13/17 118/80   17 102/70    Weight from Last 3 Encounters:   18 64.2 kg (141 lb 9.6 oz)   10/13/17 67.1 kg (148 lb)   17 64 kg (141 lb)              Today, you had the following     No orders found for display       Primary Care Provider Office Phone # Fax #    Jean Horowitz [phone] [address]  [facility] [address]        Equal Access to Services     [facility] : Hadii aad ku hadasho Soomaali, waaxda luqadaha, qaybta kaalmada adeegyada, waxay jenna hayradha rios . So [facility] [phone].    ATENCIÓN: Si habla español, tiene a acosta disposición servicios gratuitos de asistencia lingüística. Llame al [phone].    We comply with applicable federal civil rights laws and [place] laws. We do not discriminate on the basis of race, color, national origin, age, disability, sex, sexual orientation, or gender identity.            Thank you!     Thank you for choosing [facility]  for your care. Our goal is always to provide you with excellent care. Hearing back from our patients is one way we can continue to improve our services. Please take a few minutes to complete the written survey that you may receive in the mail after your visit with us. Thank you!             Your Updated Medication " List - Protect others around you: Learn how to safely use, store and throw away your medicines at www.disposemymeds.org.          This list is accurate as of 3/26/18  9:20 AM.  Always use your most recent med list.                   Brand Name Dispense Instructions for use Diagnosis    acyclovir 400 MG tablet    ZOVIRAX    180 tablet    Take 1 tablet (400 mg) by mouth 2 times daily    Encounter for medication refill       ALPRAZolam 0.5 MG tablet    XANAX    90 tablet    TAKE ONE TABLET BY MOUTH 3 TIMES DAILY AS NEEDED FOR ANXIETY    Anxiety       ferrous sulfate 325 (65 FE) MG tablet    IRON     Take 325 mg by mouth daily (with breakfast)        imipramine 50 MG tablet    TOFRANIL    180 tablet    TAKE 2 TABLETS BY MOUTH  EVERY NIGHT AT BEDTIME    Encounter for medication refill       Multi-vitamin Tabs tablet      Take 1 tablet by mouth daily        NONFORMULARY      Take 250 mg by mouth daily Progesterone        UNABLE TO FIND      Take by mouth 2 times daily MEDICATION NAME: Vivisacal - mixed supplement - hair supplement

## 2018-03-26 NOTE — PROGRESS NOTES
"Problem(s) Oriented visit        SUBJECTIVE:                                                    Lisbeth Griffin is a 57 year old female who presents to clinic today for the following health issues :    1. Anxiety  ANXIETY:  Has known history of anxiety and has been on medication for this.  The patient does not report any significant side effects of this medication.  The prior symptoms leading to the original diagnosis and decision to start medication therapy are better.     Appetite is stable.  Sleeping patterns are stable.    Overall, the level of \"racing thoughts\", emotional lability, and ease of agitation are better.    No recent panic attacks.    No flowsheet data found.      - imipramine (TOFRANIL) 50 MG tablet; TAKE 2 TABLETS BY MOUTH  EVERY NIGHT AT BEDTIME  Dispense: 180 tablet; Refill: 3    2. Dermatophytosis of nail  Long discussion         Problem list, Medication list, Allergies, and Medical/Social/Surgical histories reviewed in [facility] and updated as appropriate.   Additional history: as documented    ROS:  General and Resp. completed and negative except as noted above    Histories:   Patient Active Problem List   Diagnosis     Anxiety     Acid indigestion     History of migraine     Family history of rectal cancer     Genital herpes     Family history of early CAD     Family history of Orellana syndrome     Past Surgical History:   Procedure Laterality Date     COLONOSCOPY  11/21/2012    Procedure: COLONOSCOPY;;  Surgeon: Yamilex Baron MD;  Location:  GI     COLONOSCOPY N/A 11/26/2014    Procedure: COLONOSCOPY;  Surgeon: Yamilex Baron MD;  Location:  GI     COLONOSCOPY N/A 11/30/2016    Procedure: COMBINED COLONOSCOPY, SINGLE OR MULTIPLE BIOPSY/POLYPECTOMY BY BIOPSY;  Surgeon: Yamilex Baron MD;  Location:  GI     MAMMOPLASTY AUGMENTATION       uterine ablation         Social History   Substance Use Topics     Smoking status: Never Smoker     Smokeless tobacco: Never " "Used     Alcohol use Yes      Comment: 7 per week     Family History   Problem Relation Age of Onset     C.A.D. Maternal Grandfather 65     Colon Polyps Brother      Colon Cancer Sister      Orellana syndrome           OBJECTIVE:                                                    /58  Pulse 92  Ht 1.676 m (5' 6\")  Wt 64.2 kg (141 lb 9.6 oz)  BMI 22.85 kg/m2  Body mass index is 22.85 kg/(m^2).   APPEARANCE: = Relaxed and in no distress  SKIN: no suspicious lesions or rashes and fungus under multiple nails.     ASSESSMENT/PLAN:                                                        Lisbeth was seen today for nail problem.    Diagnoses and all orders for this visit:    Anxiety  -     imipramine (TOFRANIL) 50 MG tablet; TAKE 2 TABLETS BY MOUTH  EVERY NIGHT AT BEDTIME    Dermatophytosis of nail        Work on weight loss  Regular exercise    The following health maintenance items are reviewed in Epic and correct as of today:  Health Maintenance   Topic Date Due     ADVANCE DIRECTIVE PLANNING Q5 YRS  08/22/2015     INFLUENZA VACCINE (SYSTEM ASSIGNED)  09/01/2018     PAP SCREENING Q3 YR (SYSTEM ASSIGNED)  02/23/2019     MAMMO SCREEN Q2 YR (SYSTEM ASSIGNED)  05/08/2019     TETANUS IMMUNIZATION (SYSTEM ASSIGNED)  04/23/2022     LIPID SCREEN Q5 YR FEMALE (SYSTEM ASSIGNED)  08/21/2022     COLON CANCER SCREEN (SYSTEM ASSIGNED)  11/30/2026     HEPATITIS C SCREENING  Completed       Jean Horowitz MD  [facility]  Family Practice  [facility]  [phone]    For any issues my office # is [phone]        "

## 2018-08-24 ENCOUNTER — OFFICE VISIT (OUTPATIENT)
Dept: FAMILY MEDICINE | Facility: CLINIC | Age: 58
End: 2018-08-24

## 2018-08-24 VITALS
HEART RATE: 64 BPM | BODY MASS INDEX: 22.92 KG/M2 | WEIGHT: 142.6 LBS | DIASTOLIC BLOOD PRESSURE: 70 MMHG | SYSTOLIC BLOOD PRESSURE: 106 MMHG | RESPIRATION RATE: 12 BRPM | HEIGHT: 66 IN

## 2018-08-24 DIAGNOSIS — Z13.6 SCREENING FOR HEART DISEASE: ICD-10-CM

## 2018-08-24 DIAGNOSIS — F32.5 MAJOR DEPRESSION IN COMPLETE REMISSION (H): ICD-10-CM

## 2018-08-24 DIAGNOSIS — K21.9 GASTROESOPHAGEAL REFLUX DISEASE WITHOUT ESOPHAGITIS: ICD-10-CM

## 2018-08-24 DIAGNOSIS — Z00.00 ROUTINE GENERAL MEDICAL EXAMINATION AT A HEALTH CARE FACILITY: Primary | ICD-10-CM

## 2018-08-24 DIAGNOSIS — L65.9 HAIR LOSS: ICD-10-CM

## 2018-08-24 DIAGNOSIS — F43.21 ADJUSTMENT DISORDER WITH DEPRESSED MOOD: ICD-10-CM

## 2018-08-24 LAB
% GRANULOCYTES: 60.3 % (ref 42.2–75.2)
HCT VFR BLD AUTO: 40.9 % (ref 35–46)
HEMOGLOBIN: 13.6 G/DL (ref 11.8–15.5)
LYMPHOCYTES NFR BLD AUTO: 33.5 % (ref 20.5–51.1)
MCH RBC QN AUTO: 30.5 PG (ref 27–31)
MCHC RBC AUTO-ENTMCNC: 33.3 G/DL (ref 33–37)
MCV RBC AUTO: 91.6 FL (ref 80–100)
MONOCYTES NFR BLD AUTO: 6.2 % (ref 1.7–9.3)
PLATELET # BLD AUTO: 269 K/UL (ref 140–450)
RBC # BLD AUTO: 4.47 X10/CMM (ref 3.7–5.2)
WBC # BLD AUTO: 4.9 X10/CMM (ref 3.8–11)

## 2018-08-24 PROCEDURE — 36415 COLL VENOUS BLD VENIPUNCTURE: CPT | Performed by: FAMILY MEDICINE

## 2018-08-24 PROCEDURE — 99212 OFFICE O/P EST SF 10 MIN: CPT | Mod: 25 | Performed by: FAMILY MEDICINE

## 2018-08-24 PROCEDURE — 80050 GENERAL HEALTH PANEL: CPT | Mod: 90 | Performed by: FAMILY MEDICINE

## 2018-08-24 PROCEDURE — 99396 PREV VISIT EST AGE 40-64: CPT | Performed by: FAMILY MEDICINE

## 2018-08-24 PROCEDURE — 80061 LIPID PANEL: CPT | Mod: 90 | Performed by: FAMILY MEDICINE

## 2018-08-24 NOTE — MR AVS SNAPSHOT
After Visit Summary   8/24/2018    Lisbeth Vaughn    MRN: [id number]           Patient Information     Date Of Birth          1960        Visit Information        Provider Department      8/24/2018 8:30 AM Jean Horowitz MD [facility]        Today's Diagnoses     Routine general medical examination at a health care facility    -  1    Screening for heart disease        Gastroesophageal reflux disease without esophagitis        Adjustment disorder with depressed mood        Major depression in complete remission (H)        Hair loss          Care Instructions      Preventive Health Recommendations  Female Ages 50 - 64    Yearly exam: See your health care provider every year in order to  o Review health changes.   o Discuss preventive care.    o Review your medicines if your doctor has prescribed any.      Get a Pap test every three years (unless you have an abnormal result and your provider advises testing more often).    If you get Pap tests with HPV test, you only need to test every 5 years, unless you have an abnormal result.     You do not need a Pap test if your uterus was removed (hysterectomy) and you have not had cancer.    You should be tested each year for STDs (sexually transmitted diseases) if you're at risk.     Have a mammogram every 1 to 2 years.    Have a colonoscopy at age 50, or have a yearly FIT test (stool test). These exams screen for colon cancer.      Have a cholesterol test every 5 years, or more often if advised.    Have a diabetes test (fasting glucose) every three years. If you are at risk for diabetes, you should have this test more often.     If you are at risk for osteoporosis (brittle bone disease), think about having a bone density scan (DEXA).    Shots: Get a flu shot each year. Get a tetanus shot every 10 years.    Nutrition:     Eat at least 5 servings of fruits and vegetables each day.    Eat whole-grain bread, whole-wheat pasta and brown  rice instead of white grains and rice.    Get adequate Calcium and Vitamin D.     Lifestyle    Exercise at least 150 minutes a week (30 minutes a day, 5 days a week). This will help you control your weight and prevent disease.    Limit alcohol to one drink per day.    No smoking.     Wear sunscreen to prevent skin cancer.     See your dentist every six months for an exam and cleaning.    See your eye doctor every 1 to 2 years.            Follow-ups after your visit        Additional Services     PSYCHOLOGY REFERRAL       Your provider has referred you to:  Bindu Foster    Please be aware that coverage of these services is subject to the terms and limitations of your health insurance plan.  Call member services at your health plan with any benefit or coverage questions.      Please bring the following to your appointment:    >>   Any x-rays, CTs or MRIs which have been performed.  Contact the facility where they were done to arrange for  prior to your scheduled appointment.   >>   List of current medications   >>   This referral request   >>   Any documents/labs given to you for this referral                  Your next 10 appointments already scheduled     Dec 04, 2018   Procedure with Yamilex Baron MD   [facility] ([facility])    [address]  [place] [address] [phone]           [facility] is located at [address] [address]              Who to contact     If you have questions or need follow up information about today's clinic visit or your schedule please contact [facility] directly at [phone].  Normal or non-critical lab and imaging results will be communicated to you by MyChart, letter or phone within 4 business days after the clinic has received the results. If you do not hear from us within 7 days, please contact the clinic through MyChart or phone. If you have a critical or abnormal lab result, we will  "notify you by phone as soon as possible.  Submit refill requests through [employer] or call your pharmacy and they will forward the refill request to us. Please allow 3 business days for your refill to be completed.          Additional Information About Your Visit        Digitelhart Information     [employer] lets you send messages to your doctor, view your test results, renew your prescriptions, schedule appointments and more. To sign up, go to [email or web address] . Click on \"Log in\" on the left side of the screen, which will take you to the Welcome page. Then click on \"Sign up Now\" on the right side of the page.     You will be asked to enter the access code listed below, as well as some personal information. Please follow the directions to create your username and password.     Your access code is: [email or web address]  Expires: 2018  8:28 AM     Your access code will  in 90 days. If you need help or a new code, please call your [place] clinic or [phone].        Care EveryWhere ID     This is your Care EveryWhere ID. This could be used by other organizations to access your [place] medical records  [id number]        Your Vitals Were     Pulse Respirations Height Last Period BMI (Body Mass Index)       64 12 1.676 m (5' 6\") 2008 (Approximate) 23.02 kg/m2        Blood Pressure from Last 3 Encounters:   18 106/70   18 116/58   10/13/17 118/80    Weight from Last 3 Encounters:   18 64.7 kg (142 lb 9.6 oz)   18 64.2 kg (141 lb 9.6 oz)   10/13/17 67.1 kg (148 lb)              We Performed the Following     CBC with Diff/Plt ([facility])     Comp. Metabolic Panel (14) ([facility])     Lipid Panel ([facility])     PSYCHOLOGY REFERRAL     TSH ([facility])        Primary Care Provider Office Phone # Fax #    Jean Horowitz [phone] [address]  [facility] [address]        Equal Access to Services     MARLA STEARNS : brant Tamayo luqadaha, " fadyabelsarai milobryanaysha arceslavacharusanjuanita cristiin hayaan adeeg kharash la'aan ah. So [facility] [phone].    ATENCIÓN: Si hiral adhikari, tiene a acosta disposición servicios gratuitos de asistencia lingüística. August al [phone].    We comply with applicable federal civil rights laws and [place] laws. We do not discriminate on the basis of race, color, national origin, age, disability, sex, sexual orientation, or gender identity.            Thank you!     Thank you for choosing [facility]  for your care. Our goal is always to provide you with excellent care. Hearing back from our patients is one way we can continue to improve our services. Please take a few minutes to complete the written survey that you may receive in the mail after your visit with us. Thank you!             Your Updated Medication List - Protect others around you: Learn how to safely use, store and throw away your medicines at www.disposemymeds.org.          This list is accurate as of 8/24/18  9:31 AM.  Always use your most recent med list.                   Brand Name Dispense Instructions for use Diagnosis    acyclovir 400 MG tablet    ZOVIRAX    180 tablet    TAKE 1 TABLET (400 MG) BY MOUTH 2 TIMES DAILY    Encounter for medication refill       ALPRAZolam 0.5 MG tablet    XANAX    90 tablet    TAKE ONE TABLET BY MOUTH 3 TIMES DAILY AS NEEDED FOR ANXIETY    Anxiety       ferrous sulfate 325 (65 Fe) MG tablet    IRON     Take 325 mg by mouth daily (with breakfast)        imipramine 50 MG tablet    TOFRANIL    180 tablet    TAKE 2 TABLETS BY MOUTH  EVERY NIGHT AT BEDTIME    Anxiety       Multi-vitamin Tabs tablet      Take 1 tablet by mouth daily        NONFORMULARY      Take 250 mg by mouth daily Progesterone        UNABLE TO FIND      Take by mouth 2 times daily MEDICATION NAME: Vivisacal - mixed supplement - hair supplement

## 2018-08-24 NOTE — PROGRESS NOTES
SUBJECTIVE:   CC: Lisbeth Vaughn is an 58 year old woman who presents for preventive health visit.     Healthy Habits:    Do you get at least three servings of calcium containing foods daily (dairy, green leafy vegetables, etc.)? yes    Amount of exercise or daily activities, outside of work: 2 day(s) per week    Problems taking medications regularly No    Medication side effects: No    Have you had an eye exam in the past two years? yes    Do you see a dentist twice per year? yes    Do you have sleep apnea, excessive snoring or daytime drowsiness? no    Alopecia on the nape of the neck  Using viviscal for a couple of years.      Toe nail fungus - fingers & toes has been working to use applications    The patient complains of typical reflux symptoms: burning sensation after heavy meals, especially when lying down, sometimes with true waterbrash. Denies dysphagia, black or bloody stools or abdominal pain.    Depression:  Has known history of depression.  Has been on medication for this.  The patient does not report any significant side effects of this medication.  The prior symptoms leading to the original diagnosis and decision to start medication therapy are better.     Appetite is stable.  Sleeping patterns are stable.  No reported thoughts of suicide or homicide.    Last 3 PHQ9 results:  PHQ-9 SCORE 3/9/2017 8/21/2017 8/24/2018   Total Score 2 8 5       Hair loss,      Grief is tough with mom's loss.  Cough?- acid reflux    Today's PHQ-2 Score:   PHQ-2 ( 1999 Pf[employer]) 8/24/2018 8/21/2017   Q1: Little interest or pleasure in doing things 0 1   Q2: Feeling down, depressed or hopeless 1 1   PHQ-2 Score 1 2       Abuse: Current or Past(Physical, Sexual or Emotional)- No  Do you feel safe in your environment - Yes    Social History   Substance Use Topics     Smoking status: Never Smoker     Smokeless tobacco: Never Used     Alcohol use 3.0 - 4.2 oz/week     5 - 7 Glasses of wine per week     If you drink  alcohol do you typically have >3 drinks per day or >7 drinks per week? No                     Reviewed orders with patient.  Reviewed health maintenance and updated orders accordingly - Yes  Patient Active Problem List   Diagnosis     Anxiety     Acid indigestion     History of migraine     Family history of rectal cancer     Genital herpes     Family history of early CAD     Family history of Orellana syndrome     Past Surgical History:   Procedure Laterality Date     COLONOSCOPY  11/21/2012    Procedure: COLONOSCOPY;;  Surgeon: Yamilex Baron MD;  Location:  GI     COLONOSCOPY N/A 11/26/2014    Procedure: COLONOSCOPY;  Surgeon: Yamilex Baron MD;  Location:  GI     COLONOSCOPY N/A 11/30/2016    Procedure: COMBINED COLONOSCOPY, SINGLE OR MULTIPLE BIOPSY/POLYPECTOMY BY BIOPSY;  Surgeon: Yamilex Baron MD;  [facility]     MAMMOPLASTY AUGMENTATION       uterine ablation         Social History   Substance Use Topics     Smoking status: Never Smoker     Smokeless tobacco: Never Used     Alcohol use 3.0 - 4.2 oz/week     5 - 7 Glasses of wine per week     Family History   Problem Relation Age of Onset     C.A.D. Maternal Grandfather 65     Colon Polyps Brother      Colon Cancer Sister      Orellana syndrome           Patient over age 50, mutual decision to screen reflected in health maintenance.    Pertinent mammograms are reviewed under the imaging tab.  History of abnormal Pap smear: NO - age 30- 65 PAP every 3 years recommended  PAP / HPV Latest Ref Rng & Units 2/23/2016   HPV HIGH RISK Negative Positive(A)   HPV, LOW RISK Negative Negative     Reviewed and updated as needed this visit by clinical staff  Tobacco  Allergies  Meds         Reviewed and updated as needed this visit by Provider        Past Medical History:   Diagnosis Date     Acid indigestion     Small hiatal hernia     Anxiety      Family history of Orellana syndrome     In her sister.  EGD unremarkable with the exception of a  "small hiatal hernia 2016     Genital herpes, unspecified      History of migraines       Past Surgical History:   Procedure Laterality Date     COLONOSCOPY  11/21/2012    Procedure: COLONOSCOPY;;  Surgeon: Yamilex Baron MD;  Location:  GI     COLONOSCOPY N/A 11/26/2014    Procedure: COLONOSCOPY;  Surgeon: Yamilex Baron MD;  Location:  GI     COLONOSCOPY N/A 11/30/2016    Procedure: COMBINED COLONOSCOPY, SINGLE OR MULTIPLE BIOPSY/POLYPECTOMY BY BIOPSY;  Surgeon: Yamilex Baron MD;  Location:  GI     MAMMOPLASTY AUGMENTATION       uterine ablation         ROS:  CONSTITUTIONAL: NEGATIVE for fever, chills, change in weight  INTEGUMENTARY/SKIN: NEGATIVE for worrisome rashes, moles or lesions  EYES: NEGATIVE for vision changes or irritation  ENT: NEGATIVE for ear, mouth and throat problems  RESP: NEGATIVE for significant cough or SOB  BREAST: NEGATIVE for masses, tenderness or discharge  CV: NEGATIVE for chest pain, palpitations or peripheral edema  GI: NEGATIVE for nausea, abdominal pain, heartburn, or change in bowel habits  : NEGATIVE for unusual urinary or vaginal symptoms. No vaginal bleeding.  MUSCULOSKELETAL: NEGATIVE for significant arthralgias or myalgia  NEURO: NEGATIVE for weakness, dizziness or paresthesias  PSYCHIATRIC: NEGATIVE for changes in mood or affect     OBJECTIVE:   /70  Pulse 64  Resp 12  Ht 1.676 m (5' 6\")  Wt 64.7 kg (142 lb 9.6 oz)  LMP 08/24/2008 (Approximate)  BMI 23.02 kg/m2  EXAM:  GENERAL APPEARANCE: healthy, alert and no distress  EYES: Eyes grossly normal to inspection, PERRL and conjunctivae and sclerae normal  HENT: ear canals and TM's normal, nose and mouth without ulcers or lesions, oropharynx clear and oral mucous membranes moist  NECK: no adenopathy, no asymmetry, masses, or scars and thyroid normal to palpation  RESP: lungs clear to auscultation - no rales, rhonchi or wheezes  BREAST: normal without masses, tenderness or nipple discharge " "and no palpable axillary masses or adenopathy  CV: regular rate and rhythm, normal S1 S2, no S3 or S4, no murmur, click or rub, no peripheral edema and peripheral pulses strong  ABDOMEN: soft, nontender, no hepatosplenomegaly, no masses and bowel sounds normal  MS: no musculoskeletal defects are noted and gait is age appropriate without ataxia  SKIN: no suspicious lesions or rashes  NEURO: Normal strength and tone, sensory exam grossly normal, mentation intact and speech normal  PSYCH: mentation appears normal and affect normal/bright    Diagnostic Test Results:  none     ASSESSMENT/PLAN:   Lisbeth was seen today for physical and hearing screening.    Diagnoses and all orders for this visit:    Routine general medical examination at a health care facility    Screening for heart disease  -     Comp. Metabolic Panel (14) ([facility])  -     Lipid Panel ([facility])    Gastroesophageal reflux disease without esophagitis  -     CBC with Diff/Plt ([facility])    Adjustment disorder with depressed mood  -     PSYCHOLOGY REFERRAL    Major depression in complete remission (H)    Hair loss  -     TSH ([facility])        COUNSELING:   Reviewed preventive health counseling, as reflected in patient instructions       Regular exercise       Healthy diet/nutrition    BP Readings from Last 1 Encounters:   08/24/18 106/70     Estimated body mass index is 23.02 kg/(m^2) as calculated from the following:    Height as of this encounter: 1.676 m (5' 6\").    Weight as of this encounter: 64.7 kg (142 lb 9.6 oz).           reports that she has never smoked. She has never used smokeless tobacco.      Counseling Resources:  ATP IV Guidelines  Pooled Cohorts Equation Calculator  Breast Cancer Risk Calculator  FRAX Risk Assessment  ICSI Preventive Guidelines  Dietary Guidelines for Americans, 2010  USDA's MyPlate  ASA Prophylaxis  Lung CA Screening    Jean Horowitz MD  [facility]  "

## 2018-08-24 NOTE — LETTER
[facility] [address] [place]  [address]  Phone: [phone]    August 27, 2018      Lisbeth Vaughn  [address] [place] [address]              Dear Lisbeth,    I am writing to report that your included test results are within expected ranges. I do not suggest that we make any changes at this time.        Sincerely,     Jean Horowitz M.D.    Results for orders placed or performed in visit on 08/24/18   Comp. Metabolic Panel (14) ([facility])   Result Value Ref Range    Glucose 93 65 - 99 mg/dL    Urea Nitrogen 12 6 - 24 mg/dL    Creatinine 0.94 0.57 - 1.00 mg/dL    eGFR If NonAfricn Am 67 >59 mL/min/1.73    eGFR If Africn Am 77 >59 mL/min/1.73    BUN/Creatinine Ratio 13 9 - 23    Sodium 143 134 - 144 mmol/L    Potassium 4.4 3.5 - 5.2 mmol/L    Chloride 104 96 - 106 mmol/L    Total CO2 24 20 - 29 mmol/L    Calcium 9.4 8.7 - 10.2 mg/dL    Protein Total 7.3 6.0 - 8.5 g/dL    Albumin 4.5 3.5 - 5.5 g/dL    Globulin, Total 2.8 1.5 - 4.5 g/dL    A/G Ratio 1.6 1.2 - 2.2    Bilirubin Total 0.7 0.0 - 1.2 mg/dL    Alkaline Phosphatase 74 39 - 117 IU/L    AST 24 0 - 40 IU/L    ALT 21 0 - 32 IU/L    Narrative    Performed at:  01 - [facility] [address], [place], [place]  [id number]  : Jassi Oliva MD, Phone:  [phone]   CBC with Diff/Plt ([facility])   Result Value Ref Range    WBC x10/cmm 4.9 3.8 - 11.0 x10/cmm    % Lymphocytes 33.5 20.5 - 51.1 %    % Monocytes 6.2 1.7 - 9.3 %    % Granulocytes 60.3 42.2 - 75.2 %    RBC x10/cmm 4.47 3.7 - 5.2 x10/cmm    Hemoglobin 13.6 11.8 - 15.5 g/dl    Hematocrit 40.9 35 - 46 %    MCV 91.6 80 - 100 fL    MCH 30.5 27.0 - 31.0 pg    MCHC 33.3 33.0 - 37.0 g/dL    Platelet Count 269 140 - 450 K/uL   Lipid Panel ([facility])   Result Value Ref Range    Cholesterol 211 (H) 100 - 199 mg/dL    Triglycerides 72 0 - 149 mg/dL    HDL Cholesterol 54 >39 mg/dL    VLDL Cholesterol Luis 14 5 - 40 mg/dL    LDL Cholesterol Calculated 143 (H) 0 - 99 mg/dL     LDL/HDL Ratio 2.6 0.0 - 3.2 ratio    Narrative    Performed at:  01 - [facility] [address], [place], [place]  [id number]  : Jassi Oliva MD, Phone:  [phone]   TSH ([facility])   Result Value Ref Range    TSH 1.910 0.450 - 4.500 uIU/mL    Narrative    Performed at:  01 - [facility] [address], [place], [place]  [id number]  : Jassi Oliva MD, Phone:  [phone]

## 2018-08-25 LAB
ALBUMIN SERPL-MCNC: 4.5 G/DL (ref 3.5–5.5)
ALBUMIN/GLOB SERPL: 1.6 {RATIO} (ref 1.2–2.2)
ALP SERPL-CCNC: 74 IU/L (ref 39–117)
ALT SERPL-CCNC: 21 IU/L (ref 0–32)
AST SERPL-CCNC: 24 IU/L (ref 0–40)
BILIRUB SERPL-MCNC: 0.7 MG/DL (ref 0–1.2)
BUN SERPL-MCNC: 12 MG/DL (ref 6–24)
BUN/CREATININE RATIO: 13 (ref 9–23)
CALCIUM SERPL-MCNC: 9.4 MG/DL (ref 8.7–10.2)
CHLORIDE SERPLBLD-SCNC: 104 MMOL/L (ref 96–106)
CHOLEST SERPL-MCNC: 211 MG/DL (ref 100–199)
CREAT SERPL-MCNC: 0.94 MG/DL (ref 0.57–1)
EGFR IF AFRICN AM: 77 ML/MIN/1.73
EGFR IF NONAFRICN AM: 67 ML/MIN/1.73
GLOBULIN, TOTAL: 2.8 G/DL (ref 1.5–4.5)
GLUCOSE SERPL-MCNC: 93 MG/DL (ref 65–99)
HDLC SERPL-MCNC: 54 MG/DL
LDL/HDL RATIO: 2.6 RATIO (ref 0–3.2)
LDLC SERPL CALC-MCNC: 143 MG/DL (ref 0–99)
POTASSIUM SERPL-SCNC: 4.4 MMOL/L (ref 3.5–5.2)
PROT SERPL-MCNC: 7.3 G/DL (ref 6–8.5)
SODIUM SERPL-SCNC: 143 MMOL/L (ref 134–144)
TOTAL CO2: 24 MMOL/L (ref 20–29)
TRIGL SERPL-MCNC: 72 MG/DL (ref 0–149)
TSH BLD-ACNC: 1.91 UIU/ML (ref 0.45–4.5)
VLDLC SERPL CALC-MCNC: 14 MG/DL (ref 5–40)

## 2018-08-25 ASSESSMENT — PATIENT HEALTH QUESTIONNAIRE - PHQ9: SUM OF ALL RESPONSES TO PHQ QUESTIONS 1-9: 5

## 2018-11-06 DIAGNOSIS — F41.9 ANXIETY: ICD-10-CM

## 2018-11-06 RX ORDER — ALPRAZOLAM 0.5 MG
TABLET ORAL
Qty: 90 TABLET | Refills: 0 | Status: SHIPPED | OUTPATIENT
Start: 2018-11-06 | End: 2019-02-07

## 2018-12-04 ENCOUNTER — HOSPITAL ENCOUNTER (OUTPATIENT)
Facility: CLINIC | Age: 58
Discharge: HOME | End: 2018-12-04
Attending: COLON & RECTAL SURGERY | Admitting: COLON & RECTAL SURGERY
Payer: COMMERCIAL

## 2018-12-04 ENCOUNTER — SURGERY (OUTPATIENT)
Age: 58
End: 2018-12-04

## 2018-12-04 VITALS
DIASTOLIC BLOOD PRESSURE: 80 MMHG | WEIGHT: 142 LBS | BODY MASS INDEX: 22.82 KG/M2 | RESPIRATION RATE: 12 BRPM | OXYGEN SATURATION: 99 % | HEIGHT: 66 IN | SYSTOLIC BLOOD PRESSURE: 122 MMHG

## 2018-12-04 LAB — COLONOSCOPY: NORMAL

## 2018-12-04 PROCEDURE — G0500 MOD SEDAT ENDO SERVICE >5YRS: HCPCS | Performed by: COLON & RECTAL SURGERY

## 2018-12-04 PROCEDURE — G0105 COLORECTAL SCRN; HI RISK IND: HCPCS | Performed by: COLON & RECTAL SURGERY

## 2018-12-04 PROCEDURE — 25000128 H RX IP 250 OP 636: Performed by: COLON & RECTAL SURGERY

## 2018-12-04 PROCEDURE — 45378 DIAGNOSTIC COLONOSCOPY: CPT | Performed by: COLON & RECTAL SURGERY

## 2018-12-04 RX ORDER — ONDANSETRON 2 MG/ML
4 INJECTION INTRAMUSCULAR; INTRAVENOUS EVERY 6 HOURS PRN
Status: DISCONTINUED | OUTPATIENT
Start: 2018-12-04 | End: 2018-12-04 | Stop reason: HOSPADM

## 2018-12-04 RX ORDER — SODIUM CHLORIDE, SODIUM LACTATE, POTASSIUM CHLORIDE, CALCIUM CHLORIDE 600; 310; 30; 20 MG/100ML; MG/100ML; MG/100ML; MG/100ML
INJECTION, SOLUTION INTRAVENOUS CONTINUOUS PRN
Status: DISCONTINUED | OUTPATIENT
Start: 2018-12-04 | End: 2018-12-04 | Stop reason: HOSPADM

## 2018-12-04 RX ORDER — ONDANSETRON 2 MG/ML
4 INJECTION INTRAMUSCULAR; INTRAVENOUS
Status: DISCONTINUED | OUTPATIENT
Start: 2018-12-04 | End: 2018-12-04 | Stop reason: HOSPADM

## 2018-12-04 RX ORDER — LIDOCAINE 40 MG/G
CREAM TOPICAL
Status: DISCONTINUED | OUTPATIENT
Start: 2018-12-04 | End: 2018-12-04 | Stop reason: HOSPADM

## 2018-12-04 RX ORDER — ONDANSETRON 4 MG/1
4 TABLET, ORALLY DISINTEGRATING ORAL EVERY 6 HOURS PRN
Status: DISCONTINUED | OUTPATIENT
Start: 2018-12-04 | End: 2018-12-04 | Stop reason: HOSPADM

## 2018-12-04 RX ORDER — FENTANYL CITRATE 50 UG/ML
INJECTION, SOLUTION INTRAMUSCULAR; INTRAVENOUS PRN
Status: DISCONTINUED | OUTPATIENT
Start: 2018-12-04 | End: 2018-12-04 | Stop reason: HOSPADM

## 2018-12-04 RX ORDER — FLUMAZENIL 0.1 MG/ML
0.2 INJECTION, SOLUTION INTRAVENOUS
Status: DISCONTINUED | OUTPATIENT
Start: 2018-12-04 | End: 2018-12-04 | Stop reason: HOSPADM

## 2018-12-04 RX ORDER — NALOXONE HYDROCHLORIDE 0.4 MG/ML
.1-.4 INJECTION, SOLUTION INTRAMUSCULAR; INTRAVENOUS; SUBCUTANEOUS
Status: DISCONTINUED | OUTPATIENT
Start: 2018-12-04 | End: 2018-12-04 | Stop reason: HOSPADM

## 2018-12-04 RX ADMIN — MIDAZOLAM 2 MG: 1 INJECTION INTRAMUSCULAR; INTRAVENOUS at 09:30

## 2018-12-04 RX ADMIN — FENTANYL CITRATE 100 MCG: 50 INJECTION, SOLUTION INTRAMUSCULAR; INTRAVENOUS at 09:32

## 2018-12-04 RX ADMIN — SODIUM CHLORIDE, POTASSIUM CHLORIDE, SODIUM LACTATE AND CALCIUM CHLORIDE 150 ML/HR: 600; 310; 30; 20 INJECTION, SOLUTION INTRAVENOUS at 09:10

## 2018-12-04 NOTE — H&P
Pre-Endoscopy History and Physical     Lisbeth Vaughn MRN# [id number]   YOB: 1960 Age: 58 year old     Date of Procedure: 12/4/2018  Primary care provider: Jean Horowitz  Type of Endoscopy: colonoscopy  Reason for Procedure: screening  Type of Anesthesia Anticipated: moderate sedation    HPI:    Lisbeth is a 58 year old female who will be undergoing the above procedure.  Patient denies a change in her cassie habits or bleeding.  Patient has a history of adenomatous polyps; two sisters, with Orellana syndrome, have had colon  cancer and her brother has had polyps.     A history and physical has been performed. The patient's medications and allergies have been reviewed. The risks and benefits of the procedure and the sedation options and risks were discussed with the patient.  All questions were answered and informed consent was obtained.      She denies a personal or family history of anesthesia complications or bleeding disorders.   Prior to Admission medications    Medication Sig Start Date End Date Taking? Authorizing Provider   acyclovir (ZOVIRAX) 400 MG tablet TAKE 1 TABLET (400 MG) BY MOUTH 2 TIMES DAILY 7/31/18  Yes Mirtha Trejo MD   ALPRAZolam (XANAX) 0.5 MG tablet TAKE ONE TABLET BY MOUTH 3 TIMES DAILY AS NEEDED FOR ANXIETY 11/6/18  Yes Jean Horowitz MD   ferrous sulfate (IRON) 325 (65 FE) MG tablet Take 325 mg by mouth daily (with breakfast)   Yes Reported, Patient   multivitamin, therapeutic with minerals (MULTI-VITAMIN) TABS tablet Take 1 tablet by mouth daily   Yes Reported, Patient   Omega-3 Fatty Acids (FISH OIL OMEGA-3 PO)    Yes Reported, Patient   imipramine (TOFRANIL) 50 MG tablet TAKE 2 TABLETS BY MOUTH  EVERY NIGHT AT BEDTIME 3/26/18   Jean Horowitz MD   NONFORMULARY Take 250 mg by mouth daily Progesterone 11/19/17   Reported, Patient   UNABLE TO FIND Take by mouth 2 times daily MEDICATION NAME: Vivisacal - mixed supplement - hair supplement     "Reported, Patient       Allergies   Allergen Reactions     Codeine Sulfate         Current Facility-Administered Medications   Medication     lidocaine (LMX4) cream     lidocaine 1 % 1 mL     ondansetron (ZOFRAN) injection 4 mg     sodium chloride (PF) 0.9% PF flush 3 mL     sodium chloride (PF) 0.9% PF flush 3 mL       Patient Active Problem List   Diagnosis     Anxiety     Acid indigestion     History of migraine     Family history of rectal cancer     Genital herpes     Family history of early CAD     Family history of Orellana syndrome        Past Medical History:   Diagnosis Date     Acid indigestion     Small hiatal hernia     Anxiety      Family history of Orellana syndrome     In her sister.  EGD unremarkable with the exception of a small hiatal hernia 2016     Genital herpes, unspecified      History of migraines         Past Surgical History:   Procedure Laterality Date     COLONOSCOPY  11/21/2012    Procedure: COLONOSCOPY;;  Surgeon: Yamilex Baron MD;  Location:  GI     COLONOSCOPY N/A 11/26/2014    Procedure: COLONOSCOPY;  Surgeon: Yamilex Baron MD;  Location:  GI     COLONOSCOPY N/A 11/30/2016    Procedure: COMBINED COLONOSCOPY, SINGLE OR MULTIPLE BIOPSY/POLYPECTOMY BY BIOPSY;  Surgeon: Yamilex Baron MD;  Location:  GI     MAMMOPLASTY AUGMENTATION       uterine ablation         Social History   Substance Use Topics     Smoking status: Never Smoker     Smokeless tobacco: Never Used     Alcohol use 3.0 - 4.2 oz/week     5 - 7 Glasses of wine per week       Family History   Problem Relation Age of Onset     Colon Cancer Sister      Orellana syndrome     C.A.D. Maternal Grandfather 65     Colon Polyps Brother        REVIEW OF SYSTEMS:     5 point ROS negative except as noted above in HPI, including Gen., Resp., CV, GI &  system review.      PHYSICAL EXAM:   /74  Resp 16  Ht 1.676 m (5' 6\")  Wt 64.4 kg (142 lb)  LMP 08/24/2008 (Approximate)  SpO2 99%  BMI 22.92 kg/m2 " "Estimated body mass index is 22.92 kg/(m^2) as calculated from the following:    Height as of this encounter: 1.676 m (5' 6\").    Weight as of this encounter: 64.4 kg (142 lb).   GENERAL APPEARANCE: healthy  MENTAL STATUS: alert  AIRWAY EXAM: Mallampatti Class I (visualization of the soft palate, fauces, uvula, anterior and posterior pillars)  RESP: lungs clear to auscultation - no rales, rhonchi or wheezes  CV: regular rates and rhythm      DIAGNOSTICS:    Not indicated      IMPRESSION   ASA Class 2 - Mild systemic disease        PLAN:       Colonoscopy with possible polypectomy, possible biopsy. The indications, procedure and risks were explained to the patient who agrees to proceed.       The above has been forwarded to the consulting provider.      Signed Electronically by: Yamilex Baron  December 4, 2018        "

## 2018-12-04 NOTE — BRIEF OP NOTE
[facility] Brief Operative Note    Pre-operative diagnosis: FAMILY HISTORY OF COLONIC POLYPS   Post-operative diagnosis diverticulosis     Procedure: Procedure(s):  COLONOSCOPY   Surgeon(s): Surgeon(s) and Role:     * Yamilex Baron MD - Primary   Estimated blood loss: * No values recorded between 12/4/2018 12:00 AM and 12/4/2018  9:55 AM *    Specimens: * No specimens in log *   Findings: See Provation procedure note in Epic

## 2019-02-07 DIAGNOSIS — F41.9 ANXIETY: ICD-10-CM

## 2019-02-07 RX ORDER — ALPRAZOLAM 0.5 MG
TABLET ORAL
Qty: 30 TABLET | Refills: 0 | Status: SHIPPED | OUTPATIENT
Start: 2019-02-07 | End: 2019-03-05

## 2019-03-05 DIAGNOSIS — F41.9 ANXIETY: ICD-10-CM

## 2019-03-07 RX ORDER — ALPRAZOLAM 0.5 MG
TABLET ORAL
Qty: 30 TABLET | Refills: 0 | Status: SHIPPED | OUTPATIENT
Start: 2019-03-07 | End: 2019-05-13

## 2019-05-13 DIAGNOSIS — F41.9 ANXIETY: ICD-10-CM

## 2019-05-13 RX ORDER — ALPRAZOLAM 0.5 MG
TABLET ORAL
Qty: 30 TABLET | Refills: 0 | Status: SHIPPED | OUTPATIENT
Start: 2019-05-13 | End: 2019-06-28

## 2019-05-13 RX ORDER — IMIPRAMINE HCL 50 MG
TABLET ORAL
Qty: 180 TABLET | Refills: 0 | Status: SHIPPED | OUTPATIENT
Start: 2019-05-13 | End: 2019-08-12

## 2019-05-13 NOTE — TELEPHONE ENCOUNTER
IMIPRAMINE & ALPRAZOLAM  Last OV 8/24/18  Last PHQ-9 8/24/18    PHQ-9 SCORE 3/9/2017 8/21/2017 8/24/2018   PHQ-9 Total Score 2 8 5

## 2019-06-28 DIAGNOSIS — F41.9 ANXIETY: ICD-10-CM

## 2019-06-28 DIAGNOSIS — Z76.0 ENCOUNTER FOR MEDICATION REFILL: ICD-10-CM

## 2019-06-28 RX ORDER — ACYCLOVIR 400 MG/1
TABLET ORAL
Qty: 180 TABLET | Refills: 1 | Status: SHIPPED | OUTPATIENT
Start: 2019-06-28 | End: 2020-09-28

## 2019-06-28 RX ORDER — ALPRAZOLAM 0.5 MG
TABLET ORAL
Qty: 30 TABLET | Refills: 0 | Status: SHIPPED | OUTPATIENT
Start: 2019-06-28 | End: 2019-07-31

## 2019-06-28 NOTE — TELEPHONE ENCOUNTER
[place] OV 8/24/18 with Dr. Horowitz, future CPX scheduled 9/27/19. No contract or toxassure on file. [place]  checked last fills:  ALPRAZolam       Dispensed Days Supply Quantity Provider Pharmacy   ALPRAZOLAM 0.5 MG TABLET 05/14/2019 10 30 each [facility]...   ALPRAZOLAM 0 5 MG TABLET 03/07/2019 10 30 each [facility]...   ALPRAZOLAM 0 5 MG TABLET 02/07/2019 10 30 each [facility]...   ALPRAZOLAM 0 5 MG TABLET 11/07/2018 30 90 each [facility]...   ALPRAZOLAM 0 5 MG TABLET 07/31/2018 30 90 each [facility]...        Routed to Dr James-on call MD for review. Jacquelyn Herman

## 2019-07-31 DIAGNOSIS — F41.9 ANXIETY: ICD-10-CM

## 2019-07-31 NOTE — TELEPHONE ENCOUNTER
Alprazolam refill requested.     Last visit: 8/24/18 with Dr. Horowitz   Future appt: 9/27/19 CPX with Dr. Horowitz   Controlled Substance Agreement: none on file     ALPRAZolam       Dispensed Days Supply Quantity Provider Pharmacy   ALPRAZOLAM 0.5 MG TABLET 06/28/2019 10 30 each ESTEFANI QUILES [facility] - B[facility]...   ALPRAZOLAM 0.5 MG TABLET 05/14/2019 10 30 each JAUN HOROWITZ [facility] - BLO...   ALPRAZOLAM 0 5 MG TABLET 03/07/2019 10 30 each JAUN HOROWITZ [facility] - BLO...   ALPRAZOLAM 0 5 MG TABLET 02/07/2019 10 30 each JAUN HOROWITZ [facility] - BLO...   ALPRAZOLAM 0 5 MG TABLET 11/07/2018 30 90 each MARCOS PRATT [facility] - BLO...        Plan: routed to Dr. Horowitz for review 8/1/19 when back in office.  Judi Foster RN

## 2019-07-31 NOTE — TELEPHONE ENCOUNTER
Refill medication Xanax    LOV 08/24/2018  Labs 08/24/2018    Lexie Sunshine MA on 7/31/2019 at 9:25 AM

## 2019-08-01 RX ORDER — ALPRAZOLAM 0.5 MG
TABLET ORAL
Qty: 30 TABLET | Refills: 0 | Status: SHIPPED | OUTPATIENT
Start: 2019-08-01 | End: 2019-09-02

## 2019-08-12 DIAGNOSIS — F41.9 ANXIETY: ICD-10-CM

## 2019-08-12 RX ORDER — IMIPRAMINE HCL 50 MG
TABLET ORAL
Qty: 180 TABLET | Refills: 0 | Status: SHIPPED | OUTPATIENT
Start: 2019-08-12 | End: 2019-09-27

## 2019-08-12 NOTE — TELEPHONE ENCOUNTER
Refill medication Imipramine    LOV 08/24/2018  Labs 08/24/2018    Lexie Sunshine MA on 8/12/2019 at 10:18 AM

## 2019-09-02 DIAGNOSIS — F41.9 ANXIETY: ICD-10-CM

## 2019-09-03 RX ORDER — ALPRAZOLAM 0.5 MG
TABLET ORAL
Qty: 30 TABLET | Refills: 0 | Status: SHIPPED | OUTPATIENT
Start: 2019-09-03 | End: 2019-09-04

## 2019-09-04 DIAGNOSIS — F41.9 ANXIETY: ICD-10-CM

## 2019-09-05 RX ORDER — ALPRAZOLAM 0.5 MG
TABLET ORAL
Qty: 30 TABLET | Refills: 0 | Status: SHIPPED | OUTPATIENT
Start: 2019-09-05 | End: 2019-09-27

## 2019-09-27 ENCOUNTER — OFFICE VISIT (OUTPATIENT)
Dept: FAMILY MEDICINE | Facility: CLINIC | Age: 59
End: 2019-09-27

## 2019-09-27 VITALS
HEIGHT: 66 IN | RESPIRATION RATE: 16 BRPM | OXYGEN SATURATION: 97 % | SYSTOLIC BLOOD PRESSURE: 128 MMHG | HEART RATE: 81 BPM | BODY MASS INDEX: 23.63 KG/M2 | WEIGHT: 147 LBS | DIASTOLIC BLOOD PRESSURE: 82 MMHG

## 2019-09-27 DIAGNOSIS — Z13.6 SCREENING FOR HEART DISEASE: ICD-10-CM

## 2019-09-27 DIAGNOSIS — Z00.00 ROUTINE GENERAL MEDICAL EXAMINATION AT A HEALTH CARE FACILITY: Primary | ICD-10-CM

## 2019-09-27 DIAGNOSIS — Z12.31 ENCOUNTER FOR SCREENING MAMMOGRAM FOR BREAST CANCER: ICD-10-CM

## 2019-09-27 DIAGNOSIS — Z80.41 FAMILY HISTORY OF OVARIAN CANCER: ICD-10-CM

## 2019-09-27 DIAGNOSIS — K30 ACID INDIGESTION: ICD-10-CM

## 2019-09-27 DIAGNOSIS — B97.7 HPV IN FEMALE: ICD-10-CM

## 2019-09-27 DIAGNOSIS — F32.5 MAJOR DEPRESSION IN COMPLETE REMISSION (H): ICD-10-CM

## 2019-09-27 DIAGNOSIS — A60.04 HERPES SIMPLEX VULVOVAGINITIS: ICD-10-CM

## 2019-09-27 DIAGNOSIS — F41.9 ANXIETY: ICD-10-CM

## 2019-09-27 PROCEDURE — 36415 COLL VENOUS BLD VENIPUNCTURE: CPT | Performed by: FAMILY MEDICINE

## 2019-09-27 PROCEDURE — 99396 PREV VISIT EST AGE 40-64: CPT | Performed by: FAMILY MEDICINE

## 2019-09-27 PROCEDURE — 80061 LIPID PANEL: CPT | Mod: 90 | Performed by: FAMILY MEDICINE

## 2019-09-27 PROCEDURE — 80053 COMPREHEN METABOLIC PANEL: CPT | Mod: 90 | Performed by: FAMILY MEDICINE

## 2019-09-27 PROCEDURE — 99213 OFFICE O/P EST LOW 20 MIN: CPT | Mod: 25 | Performed by: FAMILY MEDICINE

## 2019-09-27 RX ORDER — ALPRAZOLAM 0.5 MG
0.5 TABLET ORAL 3 TIMES DAILY PRN
Qty: 30 TABLET | Refills: 3 | Status: SHIPPED | OUTPATIENT
Start: 2019-09-27 | End: 2020-02-04

## 2019-09-27 RX ORDER — IMIPRAMINE HCL 50 MG
TABLET ORAL
Qty: 180 TABLET | Refills: 3 | Status: SHIPPED | OUTPATIENT
Start: 2019-09-27 | End: 2020-09-28

## 2019-09-27 RX ORDER — CICLOPIROX 80 MG/ML
SOLUTION TOPICAL
Refills: 3 | COMMUNITY
Start: 2019-08-12 | End: 2021-10-01 | Stop reason: ALTCHOICE

## 2019-09-27 ASSESSMENT — ANXIETY QUESTIONNAIRES
7. FEELING AFRAID AS IF SOMETHING AWFUL MIGHT HAPPEN: SEVERAL DAYS
2. NOT BEING ABLE TO STOP OR CONTROL WORRYING: NOT AT ALL
IF YOU CHECKED OFF ANY PROBLEMS ON THIS QUESTIONNAIRE, HOW DIFFICULT HAVE THESE PROBLEMS MADE IT FOR YOU TO DO YOUR WORK, TAKE CARE OF THINGS AT HOME, OR GET ALONG WITH OTHER PEOPLE: NOT DIFFICULT AT ALL
1. FEELING NERVOUS, ANXIOUS, OR ON EDGE: SEVERAL DAYS
GAD7 TOTAL SCORE: 3
3. WORRYING TOO MUCH ABOUT DIFFERENT THINGS: SEVERAL DAYS
5. BEING SO RESTLESS THAT IT IS HARD TO SIT STILL: NOT AT ALL
6. BECOMING EASILY ANNOYED OR IRRITABLE: NOT AT ALL

## 2019-09-27 ASSESSMENT — PATIENT HEALTH QUESTIONNAIRE - PHQ9
5. POOR APPETITE OR OVEREATING: NOT AT ALL
SUM OF ALL RESPONSES TO PHQ QUESTIONS 1-9: 8

## 2019-09-27 ASSESSMENT — MIFFLIN-ST. JEOR: SCORE: 1250.6

## 2019-09-27 NOTE — LETTER
My Depression Action Plan  Name: Lisbeth Vaughn   Date of Birth 1960  Date: 9/27/2019    My doctor: Jean Horowitz   My clinic: [facility] [address] [place] [address] [phone]          GREEN    ZONE   Good Control    What it looks like:     Things are going generally well. You have normal up s and down s. You may even feel depressed from time to time, but bad moods usually last less than a day.   What you need to do:  1. Continue to care for yourself (see self care plan)  2. Check your depression survival kit and update it as needed  3. Follow your physician s recommendations including any medication.  4. Do not stop taking medication unless you consult with your physician first.           YELLOW         ZONE Getting Worse    What it looks like:     Depression is starting to interfere with your life.     It may be hard to get out of bed; you may be starting to isolate yourself from others.    Symptoms of depression are starting to last most all day and this has happened for several days.     You may have suicidal thoughts but they are not constant.   What you need to do:     1. Call your care team, your response to treatment will improve if you keep your care team informed of your progress. Yellow periods are signs an adjustment may need to be made.     2. Continue your self-care, even if you have to fake it!    3. Talk to someone in your support network    4. Open up your depression survival kit           RED    ZONE Medical Alert - Get Help    What it looks like:     Depression is seriously interfering with your life.     You may experience these or other symptoms: You can t get out of bed most days, can t work or engage in other necessary activities, you have trouble taking care of basic hygiene, or basic responsibilities, thoughts of suicide or death that will not go away, self-injurious behavior.     What you need to do:  1. Call your care  team and request a same-day appointment. If they are not available (weekends or after hours) call your local crisis line, emergency room or 911.            Depression Self Care Plan / Survival Kit    Self-Care for Depression  Here s the deal. Your body and mind are really not as separate as most people think.  What you do and think affects how you feel and how you feel influences what you do and think. This means if you do things that people who feel good do, it will help you feel better.  Sometimes this is all it takes.  There is also a place for medication and therapy depending on how severe your depression is, so be sure to consult with your medical provider and/ or Behavioral Health Consultant if your symptoms are worsening or not improving.     In order to better manage my stress, I will:    Exercise  Get some form of exercise, every day. This will help reduce pain and release endorphins, the  feel good  chemicals in your brain. This is almost as good as taking antidepressants!  This is not the same as joining a gym and then never going! (they count on that by the way ) It can be as simple as just going for a walk or doing some gardening, anything that will get you moving.      Hygiene   Maintain good hygiene (Get out of bed in the morning, Make your bed, Brush your teeth, Take a shower, and Get dressed like you were going to work, even if you are unemployed).  If your clothes don't fit try to get ones that do.    Diet  I will strive to eat foods that are good for me, drink plenty of water, and avoid excessive sugar, caffeine, alcohol, and other mood-altering substances.  Some foods that are helpful in depression are: complex carbohydrates, B vitamins, flaxseed, fish or fish oil, fresh fruits and vegetables.    Psychotherapy  I agree to participate in Individual Therapy (if recommended).    Medication  If prescribed medications, I agree to take them.  Missing doses can result in serious side effects.  I  understand that drinking alcohol, or other illicit drug use, may cause potential side effects.  I will not stop my medication abruptly without first discussing it with my provider.    Staying Connected With Others  I will stay in touch with my friends, family members, and my primary care provider/team.    Use your imagination  Be creative.  We all have a creative side; it doesn t matter if it s oil painting, sand castles, or mud pies! This will also kick up the endorphins.    Witness Beauty  (AKA stop and smell the roses) Take a look outside, even in mid-winter. Notice colors, textures. Watch the squirrels and birds.     Service to others  Be of service to others.  There is always someone else in need.  By helping others we can  get out of ourselves  and remember the really important things.  This also provides opportunities for practicing all the other parts of the program.    Humor  Laugh and be silly!  Adjust your TV habits for less news and crime-drama and more comedy.    Control your stress  Try breathing deep, massage therapy, biofeedback, and meditation. Find time to relax each day.     My support system    Clinic Contact:  Phone number:    Contact 1:  Phone number:    Contact 2:  Phone number:    [facility]/:  Phone number:    Therapist:  Phone number:    Local crisis center:    Phone number:    Other community support:  Phone number:

## 2019-09-27 NOTE — PROGRESS NOTES
SUBJECTIVE:   CC: Lisbeth Vaughn is an 59 year old woman who presents for preventive health visit.     Healthy Habits:    Do you get at least three servings of calcium containing foods daily (dairy, green leafy vegetables, etc.)? yes    Amount of exercise or daily activities, outside of work: 2-3 day(s) per week    Problems taking medications regularly No    Medication side effects: No    Have you had an eye exam in the past two years? yes    Do you see a dentist twice per year? yes    Do you have sleep apnea, excessive snoring or daytime drowsiness?no      HEARING FREQUENCY:     Right Ear:     500 Hz : Pass   1000 Hz: Pass   2000 Hz: Pass   4000 Hz: Pass  Left Ear:     500 Hz :  Pass   1000 Hz: Pass   2000 Hz: Pass   4000 Hz: Pass    Tatiana Panchal CMA    Today's PHQ-2 Score:   PHQ-2 ( 1999 [employer]) 9/27/2019 8/24/2018   Q1: Little interest or pleasure in doing things 0 0   Q2: Feeling down, depressed or hopeless 1 1   PHQ-2 Score 1 1       Abuse: Current or Past(Physical, Sexual or Emotional)- No  Do you feel safe in your environment? Yes    Social History     Tobacco Use     Smoking status: Never Smoker     Smokeless tobacco: Never Used   Substance Use Topics     Alcohol use: Yes     Alcohol/week: 5.0 - 7.0 standard drinks     Types: 5 - 7 Glasses of wine per week     If you drink alcohol do you typically have >3 drinks per day or >7 drinks per week? No                     Reviewed orders with patient.  Reviewed health maintenance and updated orders accordingly - Yes  Patient Active Problem List   Diagnosis     Anxiety     Acid indigestion     History of migraine     Family history of rectal cancer     Genital herpes     Family history of early CAD     Family history of Orellana syndrome     Major depression in complete remission (H)     HPV in female     Past Surgical History:   Procedure Laterality Date     COLONOSCOPY  11/21/2012    Procedure: COLONOSCOPY;;  Surgeon: Yamilex Baron MD;   Location:  GI     COLONOSCOPY N/A 11/26/2014    Procedure: COLONOSCOPY;  Surgeon: Yamilex Baron MD;  Location:  GI     COLONOSCOPY N/A 11/30/2016    Procedure: COMBINED COLONOSCOPY, SINGLE OR MULTIPLE BIOPSY/POLYPECTOMY BY BIOPSY;  Surgeon: Yamilex Baron MD;  Location:  GI     COLONOSCOPY N/A 12/4/2018    Procedure: COLONOSCOPY;  Surgeon: Yamilex Baron MD;  Location: [facility]     MAMMOPLASTY AUGMENTATION       uterine ablation         Social History     Tobacco Use     Smoking status: Never Smoker     Smokeless tobacco: Never Used   Substance Use Topics     Alcohol use: Yes     Alcohol/week: 5.0 - 7.0 standard drinks     Types: 5 - 7 Glasses of wine per week     Family History   Problem Relation Age of Onset     Colon Cancer Sister         Orellana syndrome     C.A.D. Maternal Grandfather 65     Colon Polyps Brother      Colon Cancer Sister            Mammogram Screening: Patient over age 50, mutual decision to screen reflected in health maintenance.    Pertinent mammograms are reviewed under the imaging tab.  History of abnormal Pap smear: NO - age 65 - see link Cervical Cytology Screening Guidelines  PAP / HPV Latest Ref Rng & Units 2/23/2016   HPV HIGH RISK Negative Positive(A)   HPV, LOW RISK Negative Negative     Reviewed and updated as needed this visit by clinical staff  Tobacco  Allergies  Meds         Reviewed and updated as needed this visit by Provider        Past Medical History:   Diagnosis Date     Acid indigestion     Small hiatal hernia     Anxiety      Family history of Orellana syndrome     In her sister.  EGD unremarkable with the exception of a small hiatal hernia 2016     Genital herpes, unspecified      History of migraines       Past Surgical History:   Procedure Laterality Date     COLONOSCOPY  11/21/2012    Procedure: COLONOSCOPY;;  Surgeon: Yamilex Baron MD;  Location:  GI     COLONOSCOPY N/A 11/26/2014    Procedure: COLONOSCOPY;  Surgeon: Yamilex Baron  "MD Jenny;  Location:  GI     COLONOSCOPY N/A 11/30/2016    Procedure: COMBINED COLONOSCOPY, SINGLE OR MULTIPLE BIOPSY/POLYPECTOMY BY BIOPSY;  Surgeon: Yamilex Baron MD;  Location:  GI     COLONOSCOPY N/A 12/4/2018    Procedure: COLONOSCOPY;  Surgeon: Yamilex Baron MD;  Location:  GI     MAMMOPLASTY AUGMENTATION       uterine ablation         ROS:  CONSTITUTIONAL: NEGATIVE for fever, chills, change in weight  INTEGUMENTARY/SKIN: NEGATIVE for worrisome rashes, moles or lesions  EYES: NEGATIVE for vision changes or irritation  ENT: NEGATIVE for ear, mouth and throat problems  RESP: NEGATIVE for significant cough or SOB  BREAST: NEGATIVE for masses, tenderness or discharge  CV: NEGATIVE for chest pain, palpitations or peripheral edema  GI: NEGATIVE for nausea, abdominal pain, heartburn, or change in bowel habits  : NEGATIVE for unusual urinary or vaginal symptoms. No vaginal bleeding.  MUSCULOSKELETAL: NEGATIVE for significant arthralgias or myalgia  NEURO: NEGATIVE for weakness, dizziness or paresthesias  PSYCHIATRIC: NEGATIVE for changes in mood or affect     OBJECTIVE:   /82   Pulse 81   Resp 16   Ht 1.664 m (5' 5.5\")   Wt 66.7 kg (147 lb)   LMP 08/24/2008 (Approximate)   SpO2 97%   BMI 24.09 kg/m    EXAM:  GENERAL APPEARANCE: healthy, alert and no distress  EYES: Eyes grossly normal to inspection, PERRL and conjunctivae and sclerae normal  HENT: ear canals and TM's normal, nose and mouth without ulcers or lesions, oropharynx clear and oral mucous membranes moist  NECK: no adenopathy, no asymmetry, masses, or scars and thyroid normal to palpation  RESP: lungs clear to auscultation - no rales, rhonchi or wheezes  BREAST: normal without masses, tenderness or nipple discharge and no palpable axillary masses or adenopathy  CV: regular rate and rhythm, normal S1 S2, no S3 or S4, no murmur, click or rub, no peripheral edema and peripheral pulses strong  ABDOMEN: soft, nontender, no " hepatosplenomegaly, no masses and bowel sounds normal  MS: no musculoskeletal defects are noted and gait is age appropriate without ataxia  SKIN: no suspicious lesions or rashes  NEURO: Normal strength and tone, sensory exam grossly normal, mentation intact and speech normal  PSYCH: mentation appears normal and affect normal/bright    Diagnostic Test Results:  Labs reviewed in Epic    ASSESSMENT/PLAN:   Lisbeth was seen today for physical, hearing screening and medication problem.    Diagnoses and all orders for this visit:    Routine general medical examination at a health care facility    Major depression in complete remission (H)  Spoke at length about mood disorders including suspected pathophysiology, role of neurotransmitters, and treatment options including medication options ( especially SSRIs that treat cause of sx) and counselling.    -     DEPRESSION ACTION PLAN (DAP)    Acid indigestion  Discussed the functional nature of this condition regarding what they eat, how they eat, when they eat, and how much they eat as all contributing to gastroesophageal symptoms.    Recommend anti-reflux diet and eating patterns emphasizing smaller more frequent meals and timing relative to bedtime.  Also recommend avoiding tomatoes, onions, spicy foods, caffeine, or any other food/beverage that cause increased reflux.    If symptoms not controlled on current therapies, then need to return for further evaluation and consideration of further studies.    Herpes simplex vulvovaginitis  One outbreak this year    HPV in female  Should be getting yearly paps  -     Pap IG rfx HPV ASCU ([facility])    Anxiety  Spoke at length about mood disorders including suspected pathophysiology, role of neurotransmitters, and treatment options including medication options (SSRIs that treat cause of sx and Benzos which treat the sx.) and counselling.    -     ALPRAZolam (XANAX) 0.5 MG tablet; Take 1 tablet (0.5 mg) by mouth 3 times daily as needed  "for anxiety  -     imipramine (TOFRANIL) 50 MG tablet; TAKE 2 TABLETS BY MOUTH EVERY NIGHT AT BEDTIME    Screening for heart disease  -     Comp. Metabolic Panel (14) ([facility])  -     Lipid Panel ([facility])    Family history of ovarian cancer  -     Referral to [facility]    Encounter for screening mammogram for breast cancer  -     MAMMO -  Screening Digital Bilateral (FUT[facility]); Future        COUNSELING:   Reviewed preventive health counseling, as reflected in patient instructions       Regular exercise       Healthy diet/nutrition    Estimated body mass index is 24.09 kg/m  as calculated from the following:    Height as of this encounter: 1.664 m (5' 5.5\").    Weight as of this encounter: 66.7 kg (147 lb).         reports that she has never smoked. She has never used smokeless tobacco.      Counseling Resources:  ATP IV Guidelines  Pooled Cohorts Equation Calculator  Breast Cancer Risk Calculator  FRAX Risk Assessment  ICSI Preventive Guidelines  Dietary Guidelines for Americans, 2010  USDA's MyPlate  ASA Prophylaxis  Lung CA Screening    Jean Horowitz MD  [facility]  "

## 2019-09-27 NOTE — LETTER
[facility] [address] [place]  [address]  Phone: [phone]    September 30, 2019      Lisbethsammie Dietz Elias Vaughn  [address] [facility] [address]              Dear Lisbeth,    I am writing to report that your included test results are within expected ranges. I do not suggest that we make any changes at this time.         Sincerely,     Jean Horowitz M.D.    Results for orders placed or performed in visit on 09/27/19   Comp. Metabolic Panel (14) ([facility])   Result Value Ref Range    Glucose 96 65 - 99 mg/dL    Urea Nitrogen 12 6 - 24 mg/dL    Creatinine 1.05 (H) 0.57 - 1.00 mg/dL    eGFR If NonAfricn Am 58 (L) >59 mL/min/1.73    eGFR If Africn Am 67 >59 mL/min/1.73    BUN/Creatinine Ratio 11 9 - 23    Sodium 144 134 - 144 mmol/L    Potassium 5.0 3.5 - 5.2 mmol/L    Chloride 102 96 - 106 mmol/L    Total CO2 23 20 - 29 mmol/L    Calcium 9.9 8.7 - 10.2 mg/dL    Protein Total 7.1 6.0 - 8.5 g/dL    Albumin 4.7 3.5 - 5.5 g/dL    Globulin, Total 2.4 1.5 - 4.5 g/dL    A/G Ratio 2.0 1.2 - 2.2    Bilirubin Total 1.3 (H) 0.0 - 1.2 mg/dL    Alkaline Phosphatase 74 39 - 117 IU/L    AST 21 0 - 40 IU/L    ALT 20 0 - 32 IU/L    Narrative    Performed at:  01 - [facility] [address], [place], [place]  [id number]  : Jassi Oliva MD, Phone:  [phone]   Lipid Panel ([facility])   Result Value Ref Range    Cholesterol 225 (H) 100 - 199 mg/dL    Triglycerides 78 0 - 149 mg/dL    HDL Cholesterol 55 >39 mg/dL    VLDL Cholesterol Luis 16 5 - 40 mg/dL    LDL Cholesterol Calculated 154 (H) 0 - 99 mg/dL    LDL/HDL Ratio 2.8 0.0 - 3.2 ratio    Narrative    Performed at:  01 - [facility] [address], [place], [place]  [id number]  : Jassi Oliva MD, Phone:  [phone]

## 2019-09-27 NOTE — LETTER
[facility]  [address] [place]  [address]  Phone: [phone]    October 4, 2019      Lisbeth Vaughn  [address] [facility] [address]              Dear Lisbeth,    The results from your recent visit showed Pap is normal.        Sincerely,     Jean Horowitz M.D.    Results for orders placed or performed in visit on 09/27/19   Comp. Metabolic Panel (14) ([facility])   Result Value Ref Range    Glucose 96 65 - 99 mg/dL    Urea Nitrogen 12 6 - 24 mg/dL    Creatinine 1.05 (H) 0.57 - 1.00 mg/dL    eGFR If NonAfricn Am 58 (L) >59 mL/min/1.73    eGFR If Africn Am 67 >59 mL/min/1.73    BUN/Creatinine Ratio 11 9 - 23    Sodium 144 134 - 144 mmol/L    Potassium 5.0 3.5 - 5.2 mmol/L    Chloride 102 96 - 106 mmol/L    Total CO2 23 20 - 29 mmol/L    Calcium 9.9 8.7 - 10.2 mg/dL    Protein Total 7.1 6.0 - 8.5 g/dL    Albumin 4.7 3.5 - 5.5 g/dL    Globulin, Total 2.4 1.5 - 4.5 g/dL    A/G Ratio 2.0 1.2 - 2.2    Bilirubin Total 1.3 (H) 0.0 - 1.2 mg/dL    Alkaline Phosphatase 74 39 - 117 IU/L    AST 21 0 - 40 IU/L    ALT 20 0 - 32 IU/L    Narrative    Performed at:  01 - [facility] [address], [place], [place]  [id number]  : Jassi Oliva MD, Phone:  [phone]   Lipid Panel ([facility])   Result Value Ref Range    Cholesterol 225 (H) 100 - 199 mg/dL    Triglycerides 78 0 - 149 mg/dL    HDL Cholesterol 55 >39 mg/dL    VLDL Cholesterol Luis 16 5 - 40 mg/dL    LDL Cholesterol Calculated 154 (H) 0 - 99 mg/dL    LDL/HDL Ratio 2.8 0.0 - 3.2 ratio    Narrative    Performed at:  01 - [facility] [address], [place], [place]  [id number]  : Jassi Oliva MD, Phone:  [phone]   Pap IG rfx HPV ASCU ([facility])   Result Value Ref Range    DIAGNOSIS: Comment     Specimen adequacy: Comment     Clinician Provided ICD10 Comment     Performed by: Comment     . .     Note: Comment     Test Methodology: Comment     . Comment     Narrative    Performed at:  01 - [facility]  [address] [place], [place]  [id number]  : Gema Simpson MD, Phone:  [phone]  Specimen Comment: Source.............Cervix  Specimen Comment: LMP / Prev Treat...None  Specimen Comment: Dates / Results....LMP YEARS  Specimen Comment: No. of containers..01 ThinPrep Vial

## 2019-09-28 LAB
ALBUMIN SERPL-MCNC: 4.7 G/DL (ref 3.5–5.5)
ALBUMIN/GLOB SERPL: 2 {RATIO} (ref 1.2–2.2)
ALP SERPL-CCNC: 74 IU/L (ref 39–117)
ALT SERPL-CCNC: 20 IU/L (ref 0–32)
AST SERPL-CCNC: 21 IU/L (ref 0–40)
BILIRUB SERPL-MCNC: 1.3 MG/DL (ref 0–1.2)
BUN SERPL-MCNC: 12 MG/DL (ref 6–24)
BUN/CREATININE RATIO: 11 (ref 9–23)
CALCIUM SERPL-MCNC: 9.9 MG/DL (ref 8.7–10.2)
CHLORIDE SERPLBLD-SCNC: 102 MMOL/L (ref 96–106)
CHOLEST SERPL-MCNC: 225 MG/DL (ref 100–199)
CREAT SERPL-MCNC: 1.05 MG/DL (ref 0.57–1)
EGFR IF AFRICN AM: 67 ML/MIN/1.73
EGFR IF NONAFRICN AM: 58 ML/MIN/1.73
GLOBULIN, TOTAL: 2.4 G/DL (ref 1.5–4.5)
GLUCOSE SERPL-MCNC: 96 MG/DL (ref 65–99)
HDLC SERPL-MCNC: 55 MG/DL
LDL/HDL RATIO: 2.8 RATIO (ref 0–3.2)
LDLC SERPL CALC-MCNC: 154 MG/DL (ref 0–99)
POTASSIUM SERPL-SCNC: 5 MMOL/L (ref 3.5–5.2)
PROT SERPL-MCNC: 7.1 G/DL (ref 6–8.5)
SODIUM SERPL-SCNC: 144 MMOL/L (ref 134–144)
TOTAL CO2: 23 MMOL/L (ref 20–29)
TRIGL SERPL-MCNC: 78 MG/DL (ref 0–149)
VLDLC SERPL CALC-MCNC: 16 MG/DL (ref 5–40)

## 2019-09-28 ASSESSMENT — ANXIETY QUESTIONNAIRES: GAD7 TOTAL SCORE: 3

## 2019-10-01 LAB
.: NORMAL
.: NORMAL
CLINICIAN PROVIDED ICD10: NORMAL
DIAGNOSIS:: NORMAL
Lab: NORMAL
PERFORMED BY:: NORMAL
SPECIMEN ADEQUACY:: NORMAL
TEST METHODOLOGY:: NORMAL

## 2019-10-17 ENCOUNTER — TRANSFERRED RECORDS (OUTPATIENT)
Dept: FAMILY MEDICINE | Facility: CLINIC | Age: 59
End: 2019-10-17

## 2019-10-17 NOTE — LETTER
[facility] [address] [place]  [address]  Phone: [phone]    October 18, 2019      Lisbeth Dietz Elias Vaughn                                                                                                                                 [address] [place] [id number]            Dear Lisbeth,    I am writing to report that your included test results are within expected ranges. I do not suggest that we make any changes at this time.  If you have any questions or concerns, please call the clinic at [phone] and make a follow up appointment with me.      Sincerely,    Jean Horowitz M.D.

## 2019-10-23 ENCOUNTER — HOSPITAL ENCOUNTER (OUTPATIENT)
Dept: MAMMOGRAPHY | Facility: CLINIC | Age: 59
Discharge: HOME | End: 2019-10-23
Attending: FAMILY MEDICINE | Admitting: FAMILY MEDICINE
Payer: COMMERCIAL

## 2019-10-23 DIAGNOSIS — Z12.31 ENCOUNTER FOR SCREENING MAMMOGRAM FOR BREAST CANCER: ICD-10-CM

## 2019-10-23 PROCEDURE — 77063 BREAST TOMOSYNTHESIS BI: CPT

## 2020-02-03 DIAGNOSIS — F41.9 ANXIETY: ICD-10-CM

## 2020-02-04 RX ORDER — ALPRAZOLAM 0.5 MG
0.5 TABLET ORAL 3 TIMES DAILY PRN
Qty: 30 TABLET | Refills: 3 | Status: SHIPPED | OUTPATIENT
Start: 2020-02-04 | End: 2020-06-02

## 2020-06-02 DIAGNOSIS — F41.9 ANXIETY: ICD-10-CM

## 2020-06-02 RX ORDER — ALPRAZOLAM 0.5 MG
TABLET ORAL
Qty: 30 TABLET | Refills: 3 | Status: SHIPPED | OUTPATIENT
Start: 2020-06-02 | End: 2020-09-24

## 2020-06-02 NOTE — TELEPHONE ENCOUNTER
ALPRAZolam (XANAX) 0.5 MG tablet     LOV-9/27/2019  LAST LAB-9/27/2019    Cholesterol   Date Value Ref Range Status   09/27/2019 225 (H) 100 - 199 mg/dL Final   08/24/2018 211 (H) 100 - 199 mg/dL Final     HDL Cholesterol   Date Value Ref Range Status   09/27/2019 55 >39 mg/dL Final   08/24/2018 54 >39 mg/dL Final     LDL Cholesterol Calculated   Date Value Ref Range Status   09/27/2019 154 (H) 0 - 99 mg/dL Final   08/24/2018 143 (H) 0 - 99 mg/dL Final     Triglycerides   Date Value Ref Range Status   09/27/2019 78 0 - 149 mg/dL Final   08/24/2018 72 0 - 149 mg/dL Final     No results found for: CHOLHDLRATIO  Last Comprehensive Metabolic Panel:  Sodium   Date Value Ref Range Status   09/27/2019 144 134 - 144 mmol/L Final     Potassium   Date Value Ref Range Status   09/27/2019 5.0 3.5 - 5.2 mmol/L Final     Chloride   Date Value Ref Range Status   09/27/2019 102 96 - 106 mmol/L Final     Glucose   Date Value Ref Range Status   09/27/2019 96 65 - 99 mg/dL Final     Urea Nitrogen   Date Value Ref Range Status   09/27/2019 12 6 - 24 mg/dL Final     BUN/Creatinine Ratio   Date Value Ref Range Status   09/27/2019 11 9 - 23 Final     Creatinine   Date Value Ref Range Status   09/27/2019 1.05 (H) 0.57 - 1.00 mg/dL Final     Calcium   Date Value Ref Range Status   09/27/2019 9.9 8.7 - 10.2 mg/dL Final     Bilirubin Total   Date Value Ref Range Status   09/27/2019 1.3 (H) 0.0 - 1.2 mg/dL Final     Alkaline Phosphatase   Date Value Ref Range Status   09/27/2019 74 39 - 117 IU/L Final     ALT   Date Value Ref Range Status   09/27/2019 20 0 - 32 IU/L Final     AST   Date Value Ref Range Status   09/27/2019 21 0 - 40 IU/L Final

## 2020-06-26 DIAGNOSIS — Z11.59 ENCOUNTER FOR SCREENING FOR OTHER VIRAL DISEASES: Primary | ICD-10-CM

## 2020-09-24 DIAGNOSIS — F41.9 ANXIETY: ICD-10-CM

## 2020-09-24 RX ORDER — ALPRAZOLAM 0.5 MG
TABLET ORAL
Qty: 30 TABLET | Refills: 3 | Status: SHIPPED | OUTPATIENT
Start: 2020-09-24 | End: 2021-01-22

## 2020-09-28 ENCOUNTER — OFFICE VISIT (OUTPATIENT)
Dept: FAMILY MEDICINE | Facility: CLINIC | Age: 60
End: 2020-09-28

## 2020-09-28 VITALS
BODY MASS INDEX: 23.72 KG/M2 | HEART RATE: 64 BPM | SYSTOLIC BLOOD PRESSURE: 136 MMHG | HEIGHT: 66 IN | TEMPERATURE: 98.3 F | WEIGHT: 147.6 LBS | RESPIRATION RATE: 16 BRPM | DIASTOLIC BLOOD PRESSURE: 82 MMHG | OXYGEN SATURATION: 100 %

## 2020-09-28 DIAGNOSIS — B97.7 HPV IN FEMALE: ICD-10-CM

## 2020-09-28 DIAGNOSIS — N64.4 BREAST PAIN: ICD-10-CM

## 2020-09-28 DIAGNOSIS — F41.9 ANXIETY: ICD-10-CM

## 2020-09-28 DIAGNOSIS — Z71.89 ACP (ADVANCE CARE PLANNING): ICD-10-CM

## 2020-09-28 DIAGNOSIS — Z00.00 ROUTINE GENERAL MEDICAL EXAMINATION AT A HEALTH CARE FACILITY: Primary | ICD-10-CM

## 2020-09-28 DIAGNOSIS — Z13.6 SCREENING FOR HEART DISEASE: ICD-10-CM

## 2020-09-28 DIAGNOSIS — Z76.0 ENCOUNTER FOR MEDICATION REFILL: ICD-10-CM

## 2020-09-28 DIAGNOSIS — K30 ACID INDIGESTION: ICD-10-CM

## 2020-09-28 DIAGNOSIS — F32.5 MAJOR DEPRESSION IN COMPLETE REMISSION (H): ICD-10-CM

## 2020-09-28 LAB
% GRANULOCYTES: 57.1 % (ref 42.2–75.2)
HCT VFR BLD AUTO: 41.3 % (ref 35–46)
HEMOGLOBIN: 14.1 G/DL (ref 11.8–15.5)
LYMPHOCYTES NFR BLD AUTO: 34.4 % (ref 20.5–51.1)
MCH RBC QN AUTO: 31.7 PG (ref 27–31)
MCHC RBC AUTO-ENTMCNC: 34.1 G/DL (ref 33–37)
MCV RBC AUTO: 92.9 FL (ref 80–100)
MONOCYTES NFR BLD AUTO: 8.5 % (ref 1.7–9.3)
PLATELET # BLD AUTO: 278 K/UL (ref 140–450)
RBC # BLD AUTO: 4.44 X10/CMM (ref 3.7–5.2)
WBC # BLD AUTO: 4.7 X10/CMM (ref 3.8–11)

## 2020-09-28 PROCEDURE — 99213 OFFICE O/P EST LOW 20 MIN: CPT | Mod: 25 | Performed by: FAMILY MEDICINE

## 2020-09-28 PROCEDURE — 36415 COLL VENOUS BLD VENIPUNCTURE: CPT | Performed by: FAMILY MEDICINE

## 2020-09-28 PROCEDURE — 85025 COMPLETE CBC W/AUTO DIFF WBC: CPT | Performed by: FAMILY MEDICINE

## 2020-09-28 PROCEDURE — 99396 PREV VISIT EST AGE 40-64: CPT | Performed by: FAMILY MEDICINE

## 2020-09-28 RX ORDER — IMIPRAMINE HCL 50 MG
TABLET ORAL
Qty: 180 TABLET | Refills: 3 | Status: SHIPPED | OUTPATIENT
Start: 2020-09-28 | End: 2021-10-01

## 2020-09-28 RX ORDER — ACYCLOVIR 400 MG/1
TABLET ORAL
Qty: 180 TABLET | Refills: 3 | Status: SHIPPED | OUTPATIENT
Start: 2020-09-28 | End: 2021-10-01

## 2020-09-28 ASSESSMENT — PATIENT HEALTH QUESTIONNAIRE - PHQ9
5. POOR APPETITE OR OVEREATING: NOT AT ALL
SUM OF ALL RESPONSES TO PHQ QUESTIONS 1-9: 6

## 2020-09-28 ASSESSMENT — ANXIETY QUESTIONNAIRES
3. WORRYING TOO MUCH ABOUT DIFFERENT THINGS: SEVERAL DAYS
6. BECOMING EASILY ANNOYED OR IRRITABLE: SEVERAL DAYS
7. FEELING AFRAID AS IF SOMETHING AWFUL MIGHT HAPPEN: SEVERAL DAYS
1. FEELING NERVOUS, ANXIOUS, OR ON EDGE: SEVERAL DAYS
GAD7 TOTAL SCORE: 5
2. NOT BEING ABLE TO STOP OR CONTROL WORRYING: SEVERAL DAYS
5. BEING SO RESTLESS THAT IT IS HARD TO SIT STILL: NOT AT ALL

## 2020-09-28 ASSESSMENT — MIFFLIN-ST. JEOR: SCORE: 1252.29

## 2020-09-28 NOTE — NURSING NOTE
32.5 inches on waist   For work wellness form- patient to bring in  Afia Griffin MA September 28, 2020 10:18 AM

## 2020-09-29 LAB
ALBUMIN SERPL-MCNC: 4.5 G/DL (ref 3.8–4.9)
ALBUMIN/GLOB SERPL: 1.7 {RATIO} (ref 1.2–2.2)
ALP SERPL-CCNC: 98 IU/L (ref 39–117)
ALT SERPL-CCNC: 32 IU/L (ref 0–32)
AST SERPL-CCNC: 27 IU/L (ref 0–40)
BILIRUB SERPL-MCNC: 0.6 MG/DL (ref 0–1.2)
BUN SERPL-MCNC: 12 MG/DL (ref 8–27)
BUN/CREATININE RATIO: 12 (ref 12–28)
CALCIUM SERPL-MCNC: 9.7 MG/DL (ref 8.7–10.3)
CHLORIDE SERPLBLD-SCNC: 107 MMOL/L (ref 96–106)
CHOLEST SERPL-MCNC: 224 MG/DL (ref 100–199)
CREAT SERPL-MCNC: 0.98 MG/DL (ref 0.57–1)
EGFR IF AFRICN AM: 73 ML/MIN/1.73
EGFR IF NONAFRICN AM: 63 ML/MIN/1.73
GLOBULIN, TOTAL: 2.7 G/DL (ref 1.5–4.5)
GLUCOSE SERPL-MCNC: 108 MG/DL (ref 65–99)
HDLC SERPL-MCNC: 49 MG/DL
LDL/HDL RATIO: 3.4 RATIO (ref 0–3.2)
LDLC SERPL CALC-MCNC: 165 MG/DL (ref 0–99)
POTASSIUM SERPL-SCNC: 4.5 MMOL/L (ref 3.5–5.2)
PROT SERPL-MCNC: 7.2 G/DL (ref 6–8.5)
SODIUM SERPL-SCNC: 142 MMOL/L (ref 134–144)
TOTAL CO2: 25 MMOL/L (ref 20–29)
TRIGL SERPL-MCNC: 59 MG/DL (ref 0–149)
VLDLC SERPL CALC-MCNC: 10 MG/DL (ref 5–40)

## 2020-09-29 ASSESSMENT — ANXIETY QUESTIONNAIRES: GAD7 TOTAL SCORE: 5

## 2020-10-03 LAB
.: ABNORMAL
CLINICIAN PROVIDED ICD10: ABNORMAL
DIAGNOSIS:: ABNORMAL
HPV HIGH RISK: POSITIVE
HPV, LOW RISK: NEGATIVE
Lab: ABNORMAL
PERFORMED BY:: ABNORMAL
SPECIMEN ADEQUACY:: ABNORMAL
TEST METHODOLOGY:: ABNORMAL

## 2020-10-14 ENCOUNTER — TELEPHONE (OUTPATIENT)
Dept: FAMILY MEDICINE | Facility: CLINIC | Age: 60
End: 2020-10-14

## 2020-10-14 DIAGNOSIS — B97.7 HPV IN FEMALE: Primary | ICD-10-CM

## 2020-10-14 NOTE — TELEPHONE ENCOUNTER
Called and spoke with patient regarding results pap per Dr. Horowitz. Patient to see Obyn for colposcopy. She will check with insurance for coverage and let us know where she wants to go. Contact information given to patient for [facility]. Jacquelyn Herman

## 2020-10-14 NOTE — TELEPHONE ENCOUNTER
----- Message from Jean Horowitz MD sent at 10/11/2020  5:40 PM CDT -----  Does [facility] or [facility] do colposcopy? She needs to be set up for one with us or get set up for referral, the pap came back after the other labs.....

## 2020-10-14 NOTE — TELEPHONE ENCOUNTER
Patient called clinic and wishes for referral to be sent to Dr [facility]. Referral entered and faxed with records. Jacquelyn Herman

## 2020-12-04 DIAGNOSIS — Z11.59 ENCOUNTER FOR SCREENING FOR OTHER VIRAL DISEASES: ICD-10-CM

## 2020-12-04 PROCEDURE — U0003 INFECTIOUS AGENT DETECTION BY NUCLEIC ACID (DNA OR RNA); SEVERE ACUTE RESPIRATORY SYNDROME CORONAVIRUS 2 (SARS-COV-2) (CORONAVIRUS DISEASE [COVID-19]), AMPLIFIED PROBE TECHNIQUE, MAKING USE OF HIGH THROUGHPUT TECHNOLOGIES AS DESCRIBED BY CMS-2020-01-R: HCPCS | Performed by: COLON & RECTAL SURGERY

## 2020-12-05 LAB
SARS-COV-2 RNA SPEC QL NAA+PROBE: NOT DETECTED
SPECIMEN SOURCE: NORMAL

## 2020-12-08 ENCOUNTER — HOSPITAL ENCOUNTER (OUTPATIENT)
Facility: CLINIC | Age: 60
Discharge: HOME | End: 2020-12-08
Attending: COLON & RECTAL SURGERY | Admitting: COLON & RECTAL SURGERY
Payer: COMMERCIAL

## 2020-12-08 VITALS
HEIGHT: 66 IN | DIASTOLIC BLOOD PRESSURE: 86 MMHG | RESPIRATION RATE: 18 BRPM | WEIGHT: 145 LBS | HEART RATE: 76 BPM | OXYGEN SATURATION: 99 % | BODY MASS INDEX: 23.3 KG/M2 | SYSTOLIC BLOOD PRESSURE: 131 MMHG

## 2020-12-08 LAB — COLONOSCOPY: NORMAL

## 2020-12-08 PROCEDURE — 250N000011 HC RX IP 250 OP 636: Performed by: COLON & RECTAL SURGERY

## 2020-12-08 PROCEDURE — 258N000003 HC RX IP 258 OP 636: Performed by: COLON & RECTAL SURGERY

## 2020-12-08 PROCEDURE — 45378 DIAGNOSTIC COLONOSCOPY: CPT | Performed by: COLON & RECTAL SURGERY

## 2020-12-08 PROCEDURE — G0105 COLORECTAL SCRN; HI RISK IND: HCPCS | Performed by: COLON & RECTAL SURGERY

## 2020-12-08 PROCEDURE — G0500 MOD SEDAT ENDO SERVICE >5YRS: HCPCS | Performed by: COLON & RECTAL SURGERY

## 2020-12-08 RX ORDER — ONDANSETRON 2 MG/ML
4 INJECTION INTRAMUSCULAR; INTRAVENOUS
Status: DISCONTINUED | OUTPATIENT
Start: 2020-12-08 | End: 2020-12-08 | Stop reason: HOSPADM

## 2020-12-08 RX ORDER — SODIUM CHLORIDE 9 MG/ML
INJECTION, SOLUTION INTRAVENOUS CONTINUOUS PRN
Status: DISCONTINUED | OUTPATIENT
Start: 2020-12-08 | End: 2020-12-08 | Stop reason: HOSPADM

## 2020-12-08 RX ORDER — FENTANYL CITRATE 50 UG/ML
INJECTION, SOLUTION INTRAMUSCULAR; INTRAVENOUS PRN
Status: DISCONTINUED | OUTPATIENT
Start: 2020-12-08 | End: 2020-12-08 | Stop reason: HOSPADM

## 2020-12-08 RX ORDER — LIDOCAINE 40 MG/G
CREAM TOPICAL
Status: DISCONTINUED | OUTPATIENT
Start: 2020-12-08 | End: 2020-12-08 | Stop reason: HOSPADM

## 2020-12-08 ASSESSMENT — MIFFLIN-ST. JEOR: SCORE: 1244.47

## 2020-12-08 NOTE — H&P
Pre-Endoscopy History and Physical     Lisbeth Vaughn MRN# [id number]   YOB: 1960 Age: 60 year old     Date of Procedure: 12/8/2020  Primary care provider: Jean Horowitz  Type of Endoscopy: colonoscopy  Reason for Procedure: family history and personal history of polyps, screening  Type of Anesthesia Anticipated: moderate sedation    HPI:    Lisbeth is a 60 year old female who will be undergoing the above procedure.  Patient denies a change in her bowel habits or bleeding.    A history and physical has been performed. The patient's medications and allergies have been reviewed. The risks and benefits of the procedure and the sedation options and risks were discussed with the patient.  All questions were answered and informed consent was obtained.      She denies a personal or family history of anesthesia complications or bleeding disorders.   Prior to Admission medications    Medication Sig Start Date End Date Taking? Authorizing Provider   acyclovir (ZOVIRAX) 400 MG tablet TAKE 1 TABLET (400 MG) BY MOUTH 2 TIMES DAILY 9/28/20  Yes Jean Horowitz MD   ALPRAZolam (XANAX) 0.5 MG tablet TAKE 1 TABLET (0.5 MG) BY MOUTH THREE TIMES DAILY AS NEEDED FOR ANXIETY 9/24/20  Yes Jean Horowitz MD   ciclopirox (PENLAC) 8 % external solution APPLY TO TRIMMED AFFECTED NAIL ONCE DAILY 8/12/19  Yes Reported, Patient   ferrous sulfate (IRON) 325 (65 FE) MG tablet Take 325 mg by mouth daily (with breakfast)   Yes Reported, Patient   imipramine (TOFRANIL) 50 MG tablet TAKE 2 TABLETS BY MOUTH EVERY NIGHT AT BEDTIME 9/28/20  Yes Jean Horowitz MD   multivitamin, therapeutic with minerals (MULTI-VITAMIN) TABS tablet Take 1 tablet by mouth daily   Yes Reported, Patient   NONFORMULARY Take 250 mg by mouth daily Progesterone 11/19/17  Yes Reported, Patient   Omega-3 Fatty Acids (FISH OIL OMEGA-3 PO)    Yes Reported, Patient       Allergies   Allergen Reactions     Codeine Sulfate          Current Facility-Administered Medications   Medication     lidocaine (LMX4) cream     lidocaine 1 % 0.1-1 mL     ondansetron (ZOFRAN) injection 4 mg     sodium chloride (PF) 0.9% PF flush 3 mL     sodium chloride (PF) 0.9% PF flush 3 mL       Patient Active Problem List   Diagnosis     Anxiety     Acid indigestion     History of migraine     Family history of rectal cancer     Genital herpes     Family history of early CAD     Family history of Orellana syndrome     Major depression in complete remission (H)     HPV in female        Past Medical History:   Diagnosis Date     Acid indigestion     Small hiatal hernia     Anxiety      Family history of Orellana syndrome     In her sister.  EGD unremarkable with the exception of a small hiatal hernia 2016     Genital herpes, unspecified      GERD (gastroesophageal reflux disease)      History of migraines         Past Surgical History:   Procedure Laterality Date     COLONOSCOPY  11/21/2012    Procedure: COLONOSCOPY;;  Surgeon: Yamilex Baron MD;  Location:  [facility]     COLONOSCOPY N/A 11/26/2014    Procedure: COLONOSCOPY;  Surgeon: Yamilex Baron MD;  Location: [facility]     COLONOSCOPY N/A 11/30/2016    Procedure: COMBINED COLONOSCOPY, SINGLE OR MULTIPLE BIOPSY/POLYPECTOMY BY BIOPSY;  Surgeon: Yamilex Baron MD;  Location: [facility]     COLONOSCOPY N/A 12/4/2018    Procedure: COLONOSCOPY;  Surgeon: Yamilex Baron MD;  Location: [facility]     MAMMOPLASTY AUGMENTATION       uterine ablation         Social History     Tobacco Use     Smoking status: Never Smoker     Smokeless tobacco: Never Used   Substance Use Topics     Alcohol use: Yes     Alcohol/week: 5.0 - 7.0 standard drinks     Types: 5 - 7 Glasses of wine per week     Comment: 3/ week       Family History   Problem Relation Age of Onset     Colon Cancer Sister         Orellana syndrome     Thyroid Disease Mother      Coronary Artery Disease Mother      Colon Cancer Father      C.A.D. Maternal Grandfather  "65     Colon Polyps Brother      Colon Cancer Sister        REVIEW OF SYSTEMS:     5 point ROS negative except as noted above in HPI, including Gen., Resp., CV, GI &  system review.      PHYSICAL EXAM:   /88   Pulse 94   Ht 1.676 m (5' 6\")   Wt 65.8 kg (145 lb)   LMP 08/24/2008 (Approximate)   BMI 23.40 kg/m   Estimated body mass index is 23.4 kg/m  as calculated from the following:    Height as of this encounter: 1.676 m (5' 6\").    Weight as of this encounter: 65.8 kg (145 lb).   GENERAL APPEARANCE: healthy  MENTAL STATUS: alert  AIRWAY EXAM: Mallampatti Class I (visualization of the soft palate, fauces, uvula, anterior and posterior pillars)  RESP: lungs clear to auscultation - no rales, rhonchi or wheezes  CV: regular rates and rhythm        DIAGNOSTICS:    Not indicated      IMPRESSION   ASA Class 1 - Healthy patient, no medical problems        PLAN:       Colonoscopy with possible polypectomy, possible biopsy. The indications, procedure and risks were explained to the patient who agrees to proceed.       The above has been forwarded to the consulting provider.      Signed Electronically by: Yamilex Baron MD  December 8, 2020          "

## 2020-12-08 NOTE — BRIEF OP NOTE
[facility]    Brief Operative Note    Pre-operative diagnosis: Family history of colon cancer [Z80.0]  Post-operative diagnosis diverticulosis    Procedure: Procedure(s):  COLONOSCOPY  Surgeon: Surgeon(s) and Role:     * Yamilex Baron MD - Primary  Anesthesia: Conscious Sedation   Estimated blood loss: None  Drains: None  Specimens: * No specimens in log *  Findings:   See Provation procedure note in Epic    Complications: None.  Implants: * No implants in log *

## 2021-01-22 DIAGNOSIS — F41.9 ANXIETY: ICD-10-CM

## 2021-01-22 RX ORDER — ALPRAZOLAM 0.5 MG
TABLET ORAL
Qty: 30 TABLET | Refills: 3 | Status: SHIPPED | OUTPATIENT
Start: 2021-01-22 | End: 2021-05-25

## 2021-04-17 ENCOUNTER — IMMUNIZATION (OUTPATIENT)
Dept: NURSING | Facility: CLINIC | Age: 61
End: 2021-04-17
Payer: COMMERCIAL

## 2021-04-17 PROCEDURE — 91301 PR COVID VAC MODERNA 100 MCG/0.5 ML IM: CPT

## 2021-04-17 PROCEDURE — 0011A PR COVID VAC MODERNA 100 MCG/0.5 ML IM: CPT

## 2021-05-15 ENCOUNTER — IMMUNIZATION (OUTPATIENT)
Dept: NURSING | Facility: CLINIC | Age: 61
End: 2021-05-15
Payer: COMMERCIAL

## 2021-05-15 PROCEDURE — 91301 PR COVID VAC MODERNA 100 MCG/0.5 ML IM: CPT

## 2021-05-15 PROCEDURE — 0012A PR COVID VAC MODERNA 100 MCG/0.5 ML IM: CPT

## 2021-05-24 DIAGNOSIS — F41.9 ANXIETY: ICD-10-CM

## 2021-05-24 NOTE — PATIENT INSTRUCTIONS

## 2021-05-25 RX ORDER — ALPRAZOLAM 0.5 MG
TABLET ORAL
Qty: 30 TABLET | Refills: 3 | Status: SHIPPED | OUTPATIENT
Start: 2021-05-25 | End: 2021-09-21

## 2021-09-12 ENCOUNTER — HEALTH MAINTENANCE LETTER (OUTPATIENT)
Age: 61
End: 2021-09-12

## 2021-09-20 DIAGNOSIS — F41.9 ANXIETY: ICD-10-CM

## 2021-09-21 RX ORDER — ALPRAZOLAM 0.5 MG
TABLET ORAL
Qty: 30 TABLET | Refills: 3 | Status: SHIPPED | OUTPATIENT
Start: 2021-09-21 | End: 2022-01-18

## 2021-09-27 NOTE — PROGRESS NOTES
SUBJECTIVE:   CC: Lisbeth Vaughn is an 61 year old woman who presents for preventive health visit.     Augmentation contraction pains can be very severe exploring removal and decrease.    Depression:  Has known history of depression.  Has been on medication for this.  The patient does not report any significant side effects of this medication.  The prior symptoms leading to the original diagnosis and decision to start medication therapy are better.     Appetite is stable.  Sleeping patterns are stable.  No reported thoughts of suicide or homicide.    Last 3 PHQ9 results:  PHQ-9 SCORE 9/27/2019 9/27/2019 9/28/2020 10/1/2021   PHQ-9 Total Score 8 8 6 4     .  Patient has been advised of split billing requirements and indicates understanding: Yes  Healthy Habits:    Do you get at least three servings of calcium containing foods daily (dairy, green leafy vegetables, etc.)? yes    Amount of exercise or daily activities, outside of work: 3 day(s) per week    Problems taking medications regularly No    Medication side effects: No    Have you had an eye exam in the past two years? yes    Do you see a dentist twice per year? yes    Do you have sleep apnea, excessive snoring or daytime drowsiness?was told she snores    HEARING FREQUENCY:   Right Ear:     500 Hz :  pass   1000 Hz:  pass   2000 Hz:  pass   4000 Hz:  pass  Left Ear:     500 Hz :  pass   1000 Hz:  pass   2000 Hz:  pass   4000 Hz:  pass  Doretha Monae CMA 10/1/2021  PROBLEMS TO ADD ON...  -------------------------------------    Today's PHQ-2 Score:   PHQ-2 ( 1999 [employer]) 10/1/2021 9/28/2020   Q1: Little interest or pleasure in doing things 0 0   Q2: Feeling down, depressed or hopeless 1 0   PHQ-2 Score 1 0       Abuse: Current or Past(Physical, Sexual or Emotional)- No  Do you feel safe in your environment? Yes        Social History     Tobacco Use     Smoking status: Never Smoker     Smokeless tobacco: Never Used   Substance Use Topics      Alcohol use: Yes     Alcohol/week: 5.0 - 7.0 standard drinks     Types: 5 - 7 Glasses of wine per week     Comment: 3/ week     If you drink alcohol do you typically have >3 drinks per day or >7 drinks per week? No                     Reviewed orders with patient.  Reviewed health maintenance and updated orders accordingly - Yes  Lab work is in process    FHS-7: No flowsheet data found.    Mammogram Screening: Recommended mammography every 1-2 years with patient discussion and risk factor consideration  Pertinent mammograms are reviewed under the imaging tab.    Pertinent mammograms are reviewed under the imaging tab.  History of abnormal Pap smear: YES - updated in Problem List and Health Maintenance accordingly     Reviewed and updated as needed this visit by clinical staff   Allergies  Meds  Problems             Reviewed and updated as needed this visit by Provider     Problems            Past Medical History:   Diagnosis Date     Acid indigestion     Small hiatal hernia     Anxiety      Family history of Orellana syndrome     In her sister.  EGD unremarkable with the exception of a small hiatal hernia 2016     Genital herpes, unspecified      GERD (gastroesophageal reflux disease)      History of migraines       Past Surgical History:   Procedure Laterality Date     COLONOSCOPY  11/21/2012    Procedure: COLONOSCOPY;;  Surgeon: Yamilex Baron MD;  Location: [facility]     COLONOSCOPY N/A 11/26/2014    Procedure: COLONOSCOPY;  Surgeon: Yamilex Baron MD;  Location: [facility]     COLONOSCOPY N/A 11/30/2016    Procedure: COMBINED COLONOSCOPY, SINGLE OR MULTIPLE BIOPSY/POLYPECTOMY BY BIOPSY;  Surgeon: Yamilex Baron MD;  Location: [facility]     COLONOSCOPY N/A 12/4/2018    Procedure: COLONOSCOPY;  Surgeon: Yamilex Baron MD;  Location: [facility]     COLONOSCOPY N/A 12/8/2020    Procedure: COLONOSCOPY;  Surgeon: Yamilex Baron MD;  Location: [facility]     MAMMOPLASTY AUGMENTATION       uterine ablation   "       ROS:  CONSTITUTIONAL: NEGATIVE for fever, chills, change in weight  INTEGUMENTARY/SKIN: NEGATIVE for worrisome rashes, moles or lesions  EYES: NEGATIVE for vision changes or irritation  ENT: NEGATIVE for ear, mouth and throat problems  RESP: NEGATIVE for significant cough or SOB  BREAST: NEGATIVE for masses, tenderness or discharge  CV: NEGATIVE for chest pain, palpitations or peripheral edema  GI: NEGATIVE for nausea, abdominal pain, heartburn, or change in bowel habits  : NEGATIVE for unusual urinary or vaginal symptoms. No vaginal bleeding.  MUSCULOSKELETAL: NEGATIVE for significant arthralgias or myalgia  NEURO: NEGATIVE for weakness, dizziness or paresthesias  PSYCHIATRIC: NEGATIVE for changes in mood or affect     OBJECTIVE:   /85   Pulse 75   Resp 18   Ht 1.651 m (5' 5\")   Wt 65.9 kg (145 lb 3.2 oz)   LMP 08/24/2008 (Approximate)   SpO2 99%   BMI 24.16 kg/m    EXAM:  GENERAL APPEARANCE: healthy, alert and no distress  EYES: Eyes grossly normal to inspection, PERRL and conjunctivae and sclerae normal  HENT: ear canals and TM's normal, nose and mouth without ulcers or lesions, oropharynx clear and oral mucous membranes moist  NECK: no adenopathy, no asymmetry, masses, or scars and thyroid normal to palpation  RESP: lungs clear to auscultation - no rales, rhonchi or wheezes  BREAST: normal without masses, tenderness or nipple discharge and no palpable axillary masses or adenopathy  CV: regular rate and rhythm, normal S1 S2, no S3 or S4, no murmur, click or rub, no peripheral edema and peripheral pulses strong  ABDOMEN: soft, nontender, no hepatosplenomegaly, no masses and bowel sounds normal  MS: no musculoskeletal defects are noted and gait is age appropriate without ataxia  SKIN: no suspicious lesions or rashes  NEURO: Normal strength and tone, sensory exam grossly normal, mentation intact and speech normal  PSYCH: mentation appears normal and affect normal/bright    Diagnostic Test " "Results:  Labs reviewed in Epic    ASSESSMENT/PLAN:   Lisbeth was seen today for physical, hearing screening and chest pain.    Diagnoses and all orders for this visit:    Routine general medical examination at a health care facility    Major depression in complete remission (H)    HPV in female  -     Pap IG HPV HR and LR ([facility])  -     CBC with Diff/Plt ([facility])    Encounter for screening mammogram for malignant neoplasm of breast  -     MAMMO -  Screening Digital Bilateral ([facility]); Future    Screening for heart disease  -     Lipid Panel ([facility])  -     Comp. Metabolic Panel (14) ([facility])        Patient has been advised of split billing requirements and indicates understanding: Yes  COUNSELING:   Reviewed preventive health counseling, as reflected in patient instructions       Vision screening       Hearing screening    Estimated body mass index is 24.16 kg/m  as calculated from the following:    Height as of this encounter: 1.651 m (5' 5\").    Weight as of this encounter: 65.9 kg (145 lb 3.2 oz).        She reports that she has never smoked. She has never used smokeless tobacco.      Counseling Resources:  ATP IV Guidelines  Pooled Cohorts Equation Calculator  Breast Cancer Risk Calculator  BRCA-Related Cancer Risk Assessment: FHS-7 Tool  FRAX Risk Assessment  ICSI Preventive Guidelines  Dietary Guidelines for Americans, 2010  USDA's MyPlate  ASA Prophylaxis  Lung CA Screening    Jean Horowitz MD  [facility]  "

## 2021-10-01 ENCOUNTER — OFFICE VISIT (OUTPATIENT)
Dept: FAMILY MEDICINE | Facility: CLINIC | Age: 61
End: 2021-10-01

## 2021-10-01 VITALS
SYSTOLIC BLOOD PRESSURE: 129 MMHG | HEART RATE: 75 BPM | RESPIRATION RATE: 18 BRPM | HEIGHT: 65 IN | DIASTOLIC BLOOD PRESSURE: 85 MMHG | WEIGHT: 145.2 LBS | OXYGEN SATURATION: 99 % | BODY MASS INDEX: 24.19 KG/M2

## 2021-10-01 DIAGNOSIS — F41.9 ANXIETY: ICD-10-CM

## 2021-10-01 DIAGNOSIS — Z12.31 ENCOUNTER FOR SCREENING MAMMOGRAM FOR MALIGNANT NEOPLASM OF BREAST: ICD-10-CM

## 2021-10-01 DIAGNOSIS — Z13.6 SCREENING FOR HEART DISEASE: ICD-10-CM

## 2021-10-01 DIAGNOSIS — Z00.00 ROUTINE GENERAL MEDICAL EXAMINATION AT A HEALTH CARE FACILITY: Primary | ICD-10-CM

## 2021-10-01 DIAGNOSIS — B97.7 HPV IN FEMALE: ICD-10-CM

## 2021-10-01 DIAGNOSIS — Z76.0 ENCOUNTER FOR MEDICATION REFILL: ICD-10-CM

## 2021-10-01 DIAGNOSIS — F32.5 MAJOR DEPRESSION IN COMPLETE REMISSION (H): ICD-10-CM

## 2021-10-01 LAB
% GRANULOCYTES: 55.7 % (ref 42.2–75.2)
HCT VFR BLD AUTO: 40.3 % (ref 35–46)
HEMOGLOBIN: 14 G/DL (ref 11.8–15.5)
LYMPHOCYTES NFR BLD AUTO: 35.4 % (ref 20.5–51.1)
MCH RBC QN AUTO: 31.2 PG (ref 27–31)
MCHC RBC AUTO-ENTMCNC: 34.7 G/DL (ref 33–37)
MCV RBC AUTO: 89.8 FL (ref 80–100)
MONOCYTES NFR BLD AUTO: 8.9 % (ref 1.7–9.3)
PLATELET # BLD AUTO: 268 K/UL (ref 140–450)
RBC # BLD AUTO: 4.49 X10/CMM (ref 3.7–5.2)
WBC # BLD AUTO: 3.8 X10/CMM (ref 3.8–11)

## 2021-10-01 PROCEDURE — 36415 COLL VENOUS BLD VENIPUNCTURE: CPT | Performed by: FAMILY MEDICINE

## 2021-10-01 PROCEDURE — 85025 COMPLETE CBC W/AUTO DIFF WBC: CPT | Performed by: FAMILY MEDICINE

## 2021-10-01 PROCEDURE — 99396 PREV VISIT EST AGE 40-64: CPT | Performed by: FAMILY MEDICINE

## 2021-10-01 RX ORDER — EFINACONAZOLE 100 MG/ML
SOLUTION TOPICAL
COMMUNITY
End: 2022-09-29

## 2021-10-01 RX ORDER — IMIPRAMINE HCL 50 MG
TABLET ORAL
Qty: 180 TABLET | Refills: 3 | Status: SHIPPED | OUTPATIENT
Start: 2021-10-01 | End: 2022-10-19

## 2021-10-01 RX ORDER — ACYCLOVIR 400 MG/1
TABLET ORAL
Qty: 180 TABLET | Refills: 3 | Status: SHIPPED | OUTPATIENT
Start: 2021-10-01 | End: 2023-04-20

## 2021-10-01 ASSESSMENT — PATIENT HEALTH QUESTIONNAIRE - PHQ9
5. POOR APPETITE OR OVEREATING: NOT AT ALL
SUM OF ALL RESPONSES TO PHQ QUESTIONS 1-9: 4

## 2021-10-01 ASSESSMENT — ANXIETY QUESTIONNAIRES
6. BECOMING EASILY ANNOYED OR IRRITABLE: SEVERAL DAYS
5. BEING SO RESTLESS THAT IT IS HARD TO SIT STILL: NOT AT ALL
3. WORRYING TOO MUCH ABOUT DIFFERENT THINGS: NOT AT ALL
1. FEELING NERVOUS, ANXIOUS, OR ON EDGE: NEARLY EVERY DAY
IF YOU CHECKED OFF ANY PROBLEMS ON THIS QUESTIONNAIRE, HOW DIFFICULT HAVE THESE PROBLEMS MADE IT FOR YOU TO DO YOUR WORK, TAKE CARE OF THINGS AT HOME, OR GET ALONG WITH OTHER PEOPLE: NOT DIFFICULT AT ALL
7. FEELING AFRAID AS IF SOMETHING AWFUL MIGHT HAPPEN: SEVERAL DAYS
2. NOT BEING ABLE TO STOP OR CONTROL WORRYING: MORE THAN HALF THE DAYS
GAD7 TOTAL SCORE: 7

## 2021-10-01 ASSESSMENT — MIFFLIN-ST. JEOR: SCORE: 1224.5

## 2021-10-01 NOTE — PATIENT INSTRUCTIONS
"Thank you for coming in today!   If you receive a survey via My Chart, mail or phone, please let us know if there was anything you especially appreciated today or if there is any way we can improve our clinic. We value your input.     Likewise, we are working hard to attend to our digital reputation.    Please consider reviewing our clinic on Google and/or Facebook via the following links:    [email or web address]    We truly appreciate you taking the time to do this!    General Information:    Today you had your appointment with Jean Horowitz MD    I am in the clinic:  Monday and Friday mornings  Tuesday and Thursday afternoons    I am not in the office Wednesdays, non urgent calls received on Wednesday will be addressed when I am back in the office on Thursday.    If lab work was done today as part of your evaluation you will generally be contacted via My Chart, mail, or phone with the results within 1-5 days. If there is an alarming result we will contact you by phone. Lab results come back at varying times, I generally wait until all labs are resulted before making comments on results. Please note labs are automatically released to My Chart once available.    If you need refills please contact your pharmacist. They will send a refill request to me to review. Please allow 3 business days for us to process all refill requests.    Please call or send a medical message with any questions or concerns.    Thank you for choosing [facility].  We truly appreciate you trusting us with your medical care.    Your next visit with us should be scheduled for Follow up in 1 year.     Listed next are the medical health Maintenance items we are tracking for your care and when they are next due (or overdue!)  Our goal is 100% \"up to date.\" Keep this list handy to call and schedule what you are due for in the future!    Health " Maintenance Due   Topic Date Due     HIV SCREENING  Never done     ZOSTER IMMUNIZATION (2 of 3) 04/19/2016     INFLUENZA VACCINE (1) 09/01/2021     PAP  09/28/2021     MAMMO SCREENING  10/23/2021       Sincerely,    Jean Horowitz MD

## 2021-10-01 NOTE — LETTER
[facility] [address] [place]  [address]  Phone: [phone]    October 8, 2021      Lisbeth Dietz Elias Vaughn  [address] [facility] [address]              Dear Lisbeth,     I am writing to report that your included test results are within expected ranges. I do not suggest that we make any changes at this time.     Jean Horowitz M.D./kelsea      Results for orders placed or performed in visit on 10/01/21   Pap IG HPV HR and LR ([facility])     Status: None   Result Value Ref Range    DIAGNOSIS: Comment     Specimen adequacy: Comment     Source Cervix     Performed by: Comment     Electronically signed by: Comment     . .     Note: Comment     Test Methodology: Comment     HPV Aptima Negative Negative    Narrative    Performed at:  01 - [facility]  [id number]  : Mac Rose MD, Phone:  [phone]    Specimen Comment: Source.............Cervix  Specimen Comment: LMP / Prev Treat...Rowe / BX  Specimen Comment: Dates / Results....LMP YEARS AGO  Specimen Comment: No. of containers..01 ThinPrep Vial   Lipid Panel ([facility])     Status: Abnormal   Result Value Ref Range    Cholesterol 206 (H) 100 - 199 mg/dL    Triglycerides 56 0 - 149 mg/dL    HDL Cholesterol 61 >39 mg/dL    VLDL Cholesterol Luis 10 5 - 40 mg/dL    LDL Cholesterol Calculated 135 (H) 0 - 99 mg/dL    LDL/HDL Ratio 2.2 0.0 - 3.2 ratio    Narrative    Performed at:  01 - [facility]  [address] [place] [address]  [id number]  : Jassi Oliva MD, Phone:  [phone]   Comp. Metabolic Panel (14) ([facility])     Status: Abnormal   Result Value Ref Range    Glucose 100 (H) 65 - 99 mg/dL    Urea Nitrogen 12 8 - 27 mg/dL    Creatinine 0.88 0.57 - 1.00 mg/dL    eGFR If NonAfricn Am 71 >59 mL/min/1.73    eGFR If Africn Am 82 >59 mL/min/1.73    BUN/Creatinine Ratio 14 12 - 28    Sodium 141 134 - 144 mmol/L    Potassium 4.3 3.5 - 5.2 mmol/L    Chloride 101  96 - 106 mmol/L    Total CO2 26 20 - 29 mmol/L    Calcium 9.2 8.7 - 10.3 mg/dL    Protein Total 7.2 6.0 - 8.5 g/dL    Albumin 4.4 3.8 - 4.8 g/dL    Globulin, Total 2.8 1.5 - 4.5 g/dL    A/G Ratio 1.6 1.2 - 2.2    Bilirubin Total 0.6 0.0 - 1.2 mg/dL    Alkaline Phosphatase 100 44 - 121 IU/L    AST 23 0 - 40 IU/L    ALT 22 0 - 32 IU/L    Narrative    Performed at:  01 - [facility] [address], [place], [place]  [id number]  : Jassi Oliva MD, Phone:  [phone]   CBC with Diff/Plt ([facility])     Status: Abnormal   Result Value Ref Range    WBC x10/cmm 3.8 3.8 - 11.0 x10/cmm    % Lymphocytes 35.4 20.5 - 51.1 %    % Monocytes 8.9 1.7 - 9.3 %    % Granulocytes 55.7 42.2 - 75.2 %    RBC x10/cmm 4.49 3.7 - 5.2 x10/cmm    Hemoglobin 14.0 11.8 - 15.5 g/dl    Hematocrit 40.3 35 - 46 %    MCV 89.8 80 - 100 fL    MCH 31.2 (A) 27.0 - 31.0 pg    MCHC 34.7 33.0 - 37.0 g/dL    Platelet Count 268 140 - 450 K/uL

## 2021-10-02 LAB
ALBUMIN SERPL-MCNC: 4.4 G/DL (ref 3.8–4.8)
ALBUMIN/GLOB SERPL: 1.6 {RATIO} (ref 1.2–2.2)
ALP SERPL-CCNC: 100 IU/L (ref 44–121)
ALT SERPL-CCNC: 22 IU/L (ref 0–32)
AST SERPL-CCNC: 23 IU/L (ref 0–40)
BILIRUB SERPL-MCNC: 0.6 MG/DL (ref 0–1.2)
BUN SERPL-MCNC: 12 MG/DL (ref 8–27)
BUN/CREATININE RATIO: 14 (ref 12–28)
CALCIUM SERPL-MCNC: 9.2 MG/DL (ref 8.7–10.3)
CHLORIDE SERPLBLD-SCNC: 101 MMOL/L (ref 96–106)
CHOLEST SERPL-MCNC: 206 MG/DL (ref 100–199)
CREAT SERPL-MCNC: 0.88 MG/DL (ref 0.57–1)
EGFR IF AFRICN AM: 82 ML/MIN/1.73
EGFR IF NONAFRICN AM: 71 ML/MIN/1.73
GLOBULIN, TOTAL: 2.8 G/DL (ref 1.5–4.5)
GLUCOSE SERPL-MCNC: 100 MG/DL (ref 65–99)
HDLC SERPL-MCNC: 61 MG/DL
LDL/HDL RATIO: 2.2 RATIO (ref 0–3.2)
LDLC SERPL CALC-MCNC: 135 MG/DL (ref 0–99)
POTASSIUM SERPL-SCNC: 4.3 MMOL/L (ref 3.5–5.2)
PROT SERPL-MCNC: 7.2 G/DL (ref 6–8.5)
SODIUM SERPL-SCNC: 141 MMOL/L (ref 134–144)
TOTAL CO2: 26 MMOL/L (ref 20–29)
TRIGL SERPL-MCNC: 56 MG/DL (ref 0–149)
VLDLC SERPL CALC-MCNC: 10 MG/DL (ref 5–40)

## 2021-10-02 ASSESSMENT — ANXIETY QUESTIONNAIRES: GAD7 TOTAL SCORE: 7

## 2021-10-05 LAB
.: NORMAL
DIAGNOSIS:: NORMAL
HPV APTIMA: NEGATIVE
Lab: NORMAL
Lab: NORMAL
PERFORMED BY:: NORMAL
SOURCE: NORMAL
SPECIMEN ADEQUACY:: NORMAL
TEST METHODOLOGY:: NORMAL

## 2021-10-19 PROBLEM — F32.9 MAJOR DEPRESSION: Status: ACTIVE | Noted: 2019-09-27

## 2021-12-17 ENCOUNTER — HOSPITAL ENCOUNTER (OUTPATIENT)
Dept: MAMMOGRAPHY | Facility: CLINIC | Age: 61
Discharge: HOME | End: 2021-12-17
Attending: FAMILY MEDICINE | Admitting: FAMILY MEDICINE
Payer: COMMERCIAL

## 2021-12-17 DIAGNOSIS — Z12.31 VISIT FOR SCREENING MAMMOGRAM: ICD-10-CM

## 2021-12-17 PROCEDURE — 77063 BREAST TOMOSYNTHESIS BI: CPT

## 2022-01-18 DIAGNOSIS — F41.9 ANXIETY: ICD-10-CM

## 2022-01-18 RX ORDER — ALPRAZOLAM 0.5 MG
TABLET ORAL
Qty: 30 TABLET | Refills: 3 | Status: SHIPPED | OUTPATIENT
Start: 2022-01-18 | End: 2022-05-20

## 2022-05-20 DIAGNOSIS — F41.9 ANXIETY: ICD-10-CM

## 2022-05-20 RX ORDER — ALPRAZOLAM 0.5 MG
TABLET ORAL
Qty: 30 TABLET | Refills: 3 | Status: SHIPPED | OUTPATIENT
Start: 2022-05-20 | End: 2022-09-19

## 2022-09-19 DIAGNOSIS — F41.9 ANXIETY: ICD-10-CM

## 2022-09-19 RX ORDER — ALPRAZOLAM 0.5 MG
TABLET ORAL
Qty: 30 TABLET | Refills: 3 | Status: SHIPPED | OUTPATIENT
Start: 2022-09-19 | End: 2023-01-18

## 2022-09-25 ASSESSMENT — ENCOUNTER SYMPTOMS
PARESTHESIAS: 0
JOINT SWELLING: 0
BREAST MASS: 0
CONSTIPATION: 0
NERVOUS/ANXIOUS: 0
ARTHRALGIAS: 0
FEVER: 0
WEAKNESS: 0
DIZZINESS: 0
SHORTNESS OF BREATH: 0
CHILLS: 0
PALPITATIONS: 0
DIARRHEA: 0
HEARTBURN: 1
ABDOMINAL PAIN: 0
HEMATOCHEZIA: 0
EYE PAIN: 0
MYALGIAS: 0
HEMATURIA: 0
DYSURIA: 0
NAUSEA: 0
SORE THROAT: 0
FREQUENCY: 0
HEADACHES: 0
COUGH: 1

## 2022-09-28 NOTE — PATIENT INSTRUCTIONS

## 2022-09-28 NOTE — PROGRESS NOTES
SUBJECTIVE:   CC: Lisbeth Vauhgn is an 62 year old woman who presents for preventive health visit.       Patient has been advised of split billing requirements and indicates understanding: Yes  Healthy Habits:  Answers for HPI/ROS submitted by the patient on 9/25/2022  Frequency of exercise:: 2-3 days/week  Getting at least 3 servings of Calcium per day:: Yes  Diet:: Regular (no restrictions)  Taking medications regularly:: Yes  Medication side effects:: None  Bi-annual eye exam:: Yes  Dental care twice a year:: Yes  Sleep apnea or symptoms of sleep apnea:: None  abdominal pain: No  Blood in stool: No  Blood in urine: No  chest pain: No  chills: No  congestion: No  constipation: No  cough: Yes  diarrhea: No  dizziness: No  ear pain: No  eye pain: No  nervous/anxious: No  fever: No  frequency: No  genital sores: No  headaches: No  hearing loss: No  heartburn: Yes  arthralgias: No  joint swelling: No  peripheral edema: Yes  mood changes: No  myalgias: No  nausea: No  dysuria: No  palpitations: No  Skin sensation changes: No  sore throat: No  urgency: No  rash: No  shortness of breath: No  visual disturbance: Yes  weakness: No  pelvic pain: No  vaginal bleeding: No  vaginal discharge: No  tenderness: Yes  breast mass: No  breast discharge: No  Additional concerns today:: Yes  Duration of exercise:: 15-30 minutes    Hearing Screening:  Right Ear  4000Hz: pass  2000Hz: pass  1000Hz: pass  500Hz: pass    Left Ear  4000Hz: pass  2000Hz: pass  1000Hz: pass  500Hz: pass      Today's PHQ-2 Score:   PHQ-2 ( 1999 [employer]) 9/25/2022 10/1/2021   Q1: Little interest or pleasure in doing things 0 0   Q2: Feeling down, depressed or hopeless 0 1   PHQ-2 Score 0 1   PHQ-2 Total Score (12-17 Years)- Positive if 3 or more points; Administer PHQ-A if positive - 1   Q1: Little interest or pleasure in doing things Not at all -   Q2: Feeling down, depressed or hopeless Not at all -   PHQ-2 Score 0 -       Abuse: Current or  Past(Physical, Sexual or Emotional)- No  Do you feel safe in your environment? Yes        Social History     Tobacco Use     Smoking status: Never Smoker     Smokeless tobacco: Never Used   Substance Use Topics     Alcohol use: Yes     Alcohol/week: 5.0 - 7.0 standard drinks     Types: 5 - 7 Glasses of wine per week     Comment: 3/ week     If you drink alcohol do you typically have >3 drinks per day or >7 drinks per week? No                     Reviewed orders with patient.  Reviewed health maintenance and updated orders accordingly - Yes  Lab work is in process    FHS-7:   Breast CA Risk Assessment (FHS-7) 12/17/2021 9/25/2022   Did any of your first-degree relatives have breast or ovarian cancer? No No   Did any of your relatives have bilateral breast cancer? No No   Did any man in your family have breast cancer? No No   Did any woman in your family have breast and ovarian cancer? No No   Did any woman in your family have breast cancer before age 50 y? No No   Do you have 2 or more relatives with breast and/or ovarian cancer? No No   Do you have 2 or more relatives with breast and/or bowel cancer? Yes Yes     click delete button to remove this line now  Mammogram Screening: Recommended mammography every 1-2 years with patient discussion and risk factor consideration  Pertinent mammograms are reviewed under the imaging tab.    Pertinent mammograms are reviewed under the imaging tab.  History of abnormal Pap smear: Last 3 Pap Results: No results found for: PAP  Last 3 Pap and HPV Results:       Reviewed and updated as needed this visit by clinical staff   Tobacco   Meds                Reviewed and updated as needed this visit by Provider                   Past Medical History:   Diagnosis Date     Acid indigestion     Small hiatal hernia     Anxiety      Family history of Orellana syndrome     In her sister.  EGD unremarkable with the exception of a small hiatal hernia 2016     Genital herpes, unspecified      GERD  "(gastroesophageal reflux disease)      History of migraines       Past Surgical History:   Procedure Laterality Date     COLONOSCOPY  11/21/2012    Procedure: COLONOSCOPY;;  Surgeon: Yamilex Baron MD;  Location:  GI     COLONOSCOPY N/A 11/26/2014    Procedure: COLONOSCOPY;  Surgeon: Yamilex Baron MD;  Location:  GI     COLONOSCOPY N/A 11/30/2016    Procedure: COMBINED COLONOSCOPY, SINGLE OR MULTIPLE BIOPSY/POLYPECTOMY BY BIOPSY;  Surgeon: Yamilex Baron MD;  Location:  GI     COLONOSCOPY N/A 12/4/2018    Procedure: COLONOSCOPY;  Surgeon: Yamilex Baron MD;  Location:  GI     COLONOSCOPY N/A 12/8/2020    Procedure: COLONOSCOPY;  Surgeon: Yamilex Baron MD;  Location: [facility]     MAMMOPLASTY AUGMENTATION       uterine ablation         ROS:  CONSTITUTIONAL: NEGATIVE for fever, chills, change in weight  INTEGUMENTARY/SKIN: NEGATIVE for worrisome rashes, moles or lesions  EYES: NEGATIVE for vision changes or irritation  ENT: NEGATIVE for ear, mouth and throat problems  RESP: NEGATIVE for significant cough or SOB  BREAST: NEGATIVE for masses, tenderness or discharge  CV: NEGATIVE for chest pain, palpitations or peripheral edema  GI: NEGATIVE for nausea, abdominal pain, heartburn, or change in bowel habits  : NEGATIVE for unusual urinary or vaginal symptoms. No vaginal bleeding.  MUSCULOSKELETAL: NEGATIVE for significant arthralgias or myalgia  NEURO: NEGATIVE for weakness, dizziness or paresthesias  PSYCHIATRIC: NEGATIVE for changes in mood or affect     OBJECTIVE:   /72   Pulse 80   Temp 97.5  F (36.4  C)   Resp 16   Ht 1.651 m (5' 5\")   Wt 66.6 kg (146 lb 12.8 oz)   LMP 08/24/2008 (Approximate)   SpO2 100%   BMI 24.43 kg/m    EXAM:  GENERAL APPEARANCE: healthy, alert and no distress  EYES: Eyes grossly normal to inspection, PERRL and conjunctivae and sclerae normal  HENT: ear canals and TM's normal, nose and mouth without ulcers or lesions, oropharynx clear and " oral mucous membranes moist  NECK: no adenopathy, no asymmetry, masses, or scars and thyroid normal to palpation  RESP: lungs clear to auscultation - no rales, rhonchi or wheezes  BREAST: normal without masses, tenderness or nipple discharge and no palpable axillary masses or adenopathy  BREAST: normal without masses, tenderness or nipple discharge and no palpable axillary masses or adenopathy  CV: regular rate and rhythm, normal S1 S2, no S3 or S4, no murmur, click or rub, no peripheral edema and peripheral pulses strong  ABDOMEN: soft, nontender, no hepatosplenomegaly, no masses and bowel sounds normal  MS: no musculoskeletal defects are noted and gait is age appropriate without ataxia  SKIN: no suspicious lesions or rashes  NEURO: Normal strength and tone, sensory exam grossly normal, mentation intact and speech normal  PSYCH: mentation appears normal and affect normal/bright    Diagnostic Test Results:  Labs reviewed in Epic    ASSESSMENT/PLAN:   Lisbeth was seen today for physical, other and swelling.    Diagnoses and all orders for this visit:    Routine general medical examination at a health care facility  -     VENOUS COLLECTION    History of migraine  Currently the migraines are under good control with medications preventing most episodes and effective in their use  -     CBC with Diff/Plt ([facility])  -     VENOUS COLLECTION    HPV in female  Due for recheck today last Pap was clear  -     VENOUS COLLECTION    Major depressive disorder with single episode, in full remission (H)  Depression:  Has known history of depression.  Has been on medication for this.  The patient does not report any significant side effects of this medication.  The major stress in her life has been her diagnosis many years ago with herpes we had a long discussion about the common nature of the herpes virus both oral and genital and importance of discussing with her spouse his initial reaction many years ago and how that still is a burden  "for her .  the prior symptoms leading to the original diagnosis and decision to start medication therapy are better.     Appetite is stable.  Sleeping patterns are stable.  No reported thoughts of suicide or homicide.    Last 3 PHQ9 results:  PHQ-9 SCORE 9/27/2019 9/28/2020 10/1/2021 9/29/2022   PHQ-9 Total Score 8 6 4 5       -     VENOUS COLLECTION    Screening for heart disease  -     Comp. Metabolic Panel (14) ([facility])  -     Lipid Profile ([facility])  -     VENOUS COLLECTION    Need for vaccination  -     TDAP (ADACEL,BOOSTRIX)  -     VACCINE ADMINISTRATION, INITIAL    Screening for cervical cancer  -     Pap IG HPV HR and LR ([facility])        Patient has been advised of split billing requirements and indicates understanding: Yes  COUNSELING:   Reviewed preventive health counseling, as reflected in patient instructions       Regular exercise       Healthy diet/nutrition    Estimated body mass index is 24.43 kg/m  as calculated from the following:    Height as of this encounter: 1.651 m (5' 5\").    Weight as of this encounter: 66.6 kg (146 lb 12.8 oz).        She reports that she has never smoked. She has never used smokeless tobacco.      Counseling Resources:  ATP IV Guidelines  Pooled Cohorts Equation Calculator  Breast Cancer Risk Calculator  BRCA-Related Cancer Risk Assessment: FHS-7 Tool  FRAX Risk Assessment  ICSI Preventive Guidelines  Dietary Guidelines for Americans, 2010  USDA's MyPlate  ASA Prophylaxis  Lung CA Screening    Jean Horowitz MD  [facility]  "

## 2022-09-29 ENCOUNTER — OFFICE VISIT (OUTPATIENT)
Dept: FAMILY MEDICINE | Facility: CLINIC | Age: 62
End: 2022-09-29

## 2022-09-29 VITALS
BODY MASS INDEX: 24.46 KG/M2 | SYSTOLIC BLOOD PRESSURE: 115 MMHG | RESPIRATION RATE: 16 BRPM | WEIGHT: 146.8 LBS | OXYGEN SATURATION: 100 % | HEART RATE: 80 BPM | HEIGHT: 65 IN | TEMPERATURE: 97.5 F | DIASTOLIC BLOOD PRESSURE: 72 MMHG

## 2022-09-29 DIAGNOSIS — Z23 NEED FOR VACCINATION: ICD-10-CM

## 2022-09-29 DIAGNOSIS — Z13.6 SCREENING FOR HEART DISEASE: ICD-10-CM

## 2022-09-29 DIAGNOSIS — F32.5 MAJOR DEPRESSIVE DISORDER WITH SINGLE EPISODE, IN FULL REMISSION (H): ICD-10-CM

## 2022-09-29 DIAGNOSIS — B97.7 HPV IN FEMALE: ICD-10-CM

## 2022-09-29 DIAGNOSIS — Z12.4 SCREENING FOR CERVICAL CANCER: ICD-10-CM

## 2022-09-29 DIAGNOSIS — Z00.00 ROUTINE GENERAL MEDICAL EXAMINATION AT A HEALTH CARE FACILITY: Primary | ICD-10-CM

## 2022-09-29 DIAGNOSIS — Z86.69 HISTORY OF MIGRAINE: ICD-10-CM

## 2022-09-29 LAB
% GRANULOCYTES: 67.7 % (ref 42.2–75.2)
CHOL/HDL RATIO (RMG): 4.2 MG/DL (ref 0–4.5)
CHOLESTEROL: 219 MG/DL (ref 100–199)
HCT VFR BLD AUTO: 41.2 % (ref 35–46)
HDL (RMG): 52 MG/DL (ref 40–?)
HEMOGLOBIN: 14.1 G/DL (ref 11.8–15.5)
LDL CALCULATED (RMG): 156 MG/DL (ref 0–130)
LYMPHOCYTES NFR BLD AUTO: 24.9 % (ref 20.5–51.1)
MCH RBC QN AUTO: 31.1 PG (ref 27–31)
MCHC RBC AUTO-ENTMCNC: 34.3 G/DL (ref 33–37)
MCV RBC AUTO: 90.7 FL (ref 80–100)
MONOCYTES NFR BLD AUTO: 7.4 % (ref 1.7–9.3)
PLATELET # BLD AUTO: 329 K/UL (ref 140–450)
RBC # BLD AUTO: 4.54 X10/CMM (ref 3.7–5.2)
TRIGLYCERIDES (RMG): 60 MG/DL (ref 0–149)
WBC # BLD AUTO: 6.6 X10/CMM (ref 3.8–11)

## 2022-09-29 PROCEDURE — 36415 COLL VENOUS BLD VENIPUNCTURE: CPT | Performed by: FAMILY MEDICINE

## 2022-09-29 PROCEDURE — 90715 TDAP VACCINE 7 YRS/> IM: CPT | Performed by: FAMILY MEDICINE

## 2022-09-29 PROCEDURE — 99213 OFFICE O/P EST LOW 20 MIN: CPT | Mod: 25 | Performed by: FAMILY MEDICINE

## 2022-09-29 PROCEDURE — 90471 IMMUNIZATION ADMIN: CPT | Mod: 59 | Performed by: FAMILY MEDICINE

## 2022-09-29 PROCEDURE — 80061 LIPID PANEL: CPT | Mod: QW | Performed by: FAMILY MEDICINE

## 2022-09-29 PROCEDURE — 85025 COMPLETE CBC W/AUTO DIFF WBC: CPT | Performed by: FAMILY MEDICINE

## 2022-09-29 PROCEDURE — 99396 PREV VISIT EST AGE 40-64: CPT | Performed by: FAMILY MEDICINE

## 2022-09-29 RX ORDER — SERTRALINE HYDROCHLORIDE 100 MG/1
TABLET, FILM COATED ORAL
COMMUNITY
Start: 2022-05-09 | End: 2023-05-30

## 2022-09-29 RX ORDER — ATORVASTATIN CALCIUM 10 MG/1
TABLET, FILM COATED ORAL
COMMUNITY
Start: 2022-05-09 | End: 2023-05-30

## 2022-09-29 ASSESSMENT — ANXIETY QUESTIONNAIRES
5. BEING SO RESTLESS THAT IT IS HARD TO SIT STILL: NOT AT ALL
GAD7 TOTAL SCORE: 1
1. FEELING NERVOUS, ANXIOUS, OR ON EDGE: SEVERAL DAYS
7. FEELING AFRAID AS IF SOMETHING AWFUL MIGHT HAPPEN: NOT AT ALL
3. WORRYING TOO MUCH ABOUT DIFFERENT THINGS: NOT AT ALL
GAD7 TOTAL SCORE: 1
6. BECOMING EASILY ANNOYED OR IRRITABLE: NOT AT ALL
2. NOT BEING ABLE TO STOP OR CONTROL WORRYING: NOT AT ALL
IF YOU CHECKED OFF ANY PROBLEMS ON THIS QUESTIONNAIRE, HOW DIFFICULT HAVE THESE PROBLEMS MADE IT FOR YOU TO DO YOUR WORK, TAKE CARE OF THINGS AT HOME, OR GET ALONG WITH OTHER PEOPLE: NOT DIFFICULT AT ALL

## 2022-09-29 ASSESSMENT — PATIENT HEALTH QUESTIONNAIRE - PHQ9
SUM OF ALL RESPONSES TO PHQ QUESTIONS 1-9: 5
5. POOR APPETITE OR OVEREATING: NOT AT ALL

## 2022-09-30 LAB
ALBUMIN SERPL-MCNC: 4.5 G/DL (ref 3.8–4.8)
ALBUMIN/GLOB SERPL: 1.5 {RATIO} (ref 1.2–2.2)
ALP SERPL-CCNC: 114 IU/L (ref 44–121)
ALT SERPL-CCNC: 35 IU/L (ref 0–32)
AST SERPL-CCNC: 26 IU/L (ref 0–40)
BILIRUB SERPL-MCNC: 1 MG/DL (ref 0–1.2)
BUN SERPL-MCNC: 14 MG/DL (ref 8–27)
BUN/CREATININE RATIO: 14 (ref 12–28)
CALCIUM SERPL-MCNC: 9.3 MG/DL (ref 8.7–10.3)
CHLORIDE SERPLBLD-SCNC: 101 MMOL/L (ref 96–106)
CREAT SERPL-MCNC: 0.97 MG/DL (ref 0.57–1)
EGFR: 66 ML/MIN/1.73
GLOBULIN, TOTAL: 3 G/DL (ref 1.5–4.5)
GLUCOSE SERPL-MCNC: 98 MG/DL (ref 70–99)
POTASSIUM SERPL-SCNC: 4.3 MMOL/L (ref 3.5–5.2)
PROT SERPL-MCNC: 7.5 G/DL (ref 6–8.5)
SODIUM SERPL-SCNC: 139 MMOL/L (ref 134–144)
TOTAL CO2: 25 MMOL/L (ref 20–29)

## 2022-09-30 NOTE — RESULT ENCOUNTER NOTE
Dear Lisbeth,     I am writing to report that your included test results are as expected.    Many labs contain some results that are slightly outside of the normal range, I have reviewed any of these results and they require no changes at this time.    Jean Horowitz MD

## 2022-10-19 DIAGNOSIS — F41.9 ANXIETY: ICD-10-CM

## 2022-10-19 RX ORDER — IMIPRAMINE HCL 50 MG
TABLET ORAL
Qty: 180 TABLET | Refills: 3 | Status: SHIPPED | OUTPATIENT
Start: 2022-10-19 | End: 2023-10-20

## 2022-11-19 ENCOUNTER — HEALTH MAINTENANCE LETTER (OUTPATIENT)
Age: 62
End: 2022-11-19

## 2022-12-23 ENCOUNTER — HOSPITAL ENCOUNTER (OUTPATIENT)
Dept: MAMMOGRAPHY | Facility: CLINIC | Age: 62
Discharge: HOME | End: 2022-12-23
Attending: FAMILY MEDICINE | Admitting: FAMILY MEDICINE
Payer: COMMERCIAL

## 2022-12-23 DIAGNOSIS — Z12.31 VISIT FOR SCREENING MAMMOGRAM: ICD-10-CM

## 2022-12-23 PROCEDURE — 77067 SCR MAMMO BI INCL CAD: CPT

## 2023-01-18 DIAGNOSIS — F41.9 ANXIETY: ICD-10-CM

## 2023-01-18 RX ORDER — ALPRAZOLAM 0.5 MG
TABLET ORAL
Qty: 30 TABLET | Refills: 3 | Status: SHIPPED | OUTPATIENT
Start: 2023-01-18 | End: 2023-05-18

## 2023-04-20 DIAGNOSIS — Z76.0 ENCOUNTER FOR MEDICATION REFILL: ICD-10-CM

## 2023-04-20 RX ORDER — ACYCLOVIR 400 MG/1
TABLET ORAL
Qty: 180 TABLET | Refills: 3 | Status: SHIPPED | OUTPATIENT
Start: 2023-04-20

## 2023-05-17 ENCOUNTER — MYC MEDICAL ADVICE (OUTPATIENT)
Dept: GASTROENTEROLOGY | Facility: CLINIC | Age: 63
End: 2023-05-17
Payer: COMMERCIAL

## 2023-05-17 ENCOUNTER — PATIENT OUTREACH (OUTPATIENT)
Dept: GASTROENTEROLOGY | Facility: CLINIC | Age: 63
End: 2023-05-17
Payer: COMMERCIAL

## 2023-05-17 DIAGNOSIS — Z12.11 SPECIAL SCREENING FOR MALIGNANT NEOPLASMS, COLON: Primary | ICD-10-CM

## 2023-05-17 NOTE — TELEPHONE ENCOUNTER
"Ordering/Referring Provider: Jean Horowitz   BMI: Estimated body mass index is 24.43 kg/m  as calculated from the following:    Height as of 9/29/22: 1.651 m (5' 5\").    Weight as of 9/29/22: 66.6 kg (146 lb 12.8 oz).     Sedation:  Based on patients medical history patient is appropriate for   moderate sedation. If patient's BMI > 50 do not schedule in ASC.    Location:  Does patient have an LVAD?  No    Does patient have a history of moderate to severe sleep apnea?  No    Does patient have a history of asthma, COPD or any other lung disease?  No    Does patient have a history of cardiac disease?  No    Is pataient awaiting a heart or lung transplant?   No    Has patient had a stroke or transient ischemic attack in the last 6 months?   No    Is the patient currently on dialysis?   No    Prep:  Previous prep (last colonoscopy):   MiraLAX (No Mag Citrate)    Quality of previous prep:   Excellent    Is patient currently on dialysis, is ESRD, or CKD stage 4/5?   No (standard prep)    Does patient have a diagnosis of diabetes?  No    Does patient have a diagnosis of cystic fibrosis (CF)?  No    BMI > 40?  No    Final Referral Status:  meets the criteria for placement of colonoscopy screening  referral order.      Referral order placed with the following recommendations:  Sedation: Moderate Sedation  Location Type: No Scheduling Restrictions  Prep: MiraLAX (No Mag Citrate)     Padmaja Umanzor RN on 5/17/2023 at 8:43 AM          "

## 2023-05-18 DIAGNOSIS — F41.9 ANXIETY: ICD-10-CM

## 2023-05-18 RX ORDER — ALPRAZOLAM 0.5 MG
TABLET ORAL
Qty: 30 TABLET | Refills: 3 | Status: SHIPPED | OUTPATIENT
Start: 2023-05-18 | End: 2023-09-14

## 2023-05-30 ENCOUNTER — OFFICE VISIT (OUTPATIENT)
Dept: FAMILY MEDICINE | Facility: CLINIC | Age: 63
End: 2023-05-30

## 2023-05-30 VITALS
OXYGEN SATURATION: 100 % | DIASTOLIC BLOOD PRESSURE: 88 MMHG | SYSTOLIC BLOOD PRESSURE: 127 MMHG | HEART RATE: 82 BPM | WEIGHT: 147.6 LBS | HEIGHT: 66 IN | BODY MASS INDEX: 23.72 KG/M2

## 2023-05-30 DIAGNOSIS — T85.43XA LEAKAGE OF BREAST IMPLANT, INITIAL ENCOUNTER: ICD-10-CM

## 2023-05-30 DIAGNOSIS — F32.5 MAJOR DEPRESSIVE DISORDER WITH SINGLE EPISODE, IN FULL REMISSION (H): Primary | ICD-10-CM

## 2023-05-30 PROCEDURE — 99214 OFFICE O/P EST MOD 30 MIN: CPT | Performed by: FAMILY MEDICINE

## 2023-05-30 ASSESSMENT — PATIENT HEALTH QUESTIONNAIRE - PHQ9
SUM OF ALL RESPONSES TO PHQ QUESTIONS 1-9: 2
5. POOR APPETITE OR OVEREATING: NOT AT ALL

## 2023-05-30 ASSESSMENT — ANXIETY QUESTIONNAIRES
IF YOU CHECKED OFF ANY PROBLEMS ON THIS QUESTIONNAIRE, HOW DIFFICULT HAVE THESE PROBLEMS MADE IT FOR YOU TO DO YOUR WORK, TAKE CARE OF THINGS AT HOME, OR GET ALONG WITH OTHER PEOPLE: NOT DIFFICULT AT ALL
GAD7 TOTAL SCORE: 1
6. BECOMING EASILY ANNOYED OR IRRITABLE: NOT AT ALL
3. WORRYING TOO MUCH ABOUT DIFFERENT THINGS: NOT AT ALL
5. BEING SO RESTLESS THAT IT IS HARD TO SIT STILL: NOT AT ALL
GAD7 TOTAL SCORE: 1
7. FEELING AFRAID AS IF SOMETHING AWFUL MIGHT HAPPEN: NOT AT ALL
2. NOT BEING ABLE TO STOP OR CONTROL WORRYING: NOT AT ALL
1. FEELING NERVOUS, ANXIOUS, OR ON EDGE: SEVERAL DAYS

## 2023-05-30 NOTE — PROGRESS NOTES
Has implants and has a suddenly hard area around the top  Problem(s) Oriented visit      ROS:  General and Resp. completed and negative except as noted above     HISTORY:   reports current alcohol use of about 5.0 - 7.0 standard drinks of alcohol per week.   reports that she has never smoked. She has never used smokeless tobacco.    Past Medical History:   Diagnosis Date     Acid indigestion      Anxiety      Family history of Orellana syndrome      Genital herpes, unspecified      GERD (gastroesophageal reflux disease)      History of migraines      Past Surgical History:   Procedure Laterality Date     COLONOSCOPY  11/21/2012    Procedure: COLONOSCOPY;;  Surgeon: Yamilex Baron MD;  Location:  GI     COLONOSCOPY N/A 11/26/2014    Procedure: COLONOSCOPY;  Surgeon: Yamilex Baron MD;  Location: [facility]     COLONOSCOPY N/A 11/30/2016    Procedure: COMBINED COLONOSCOPY, SINGLE OR MULTIPLE BIOPSY/POLYPECTOMY BY BIOPSY;  Surgeon: Yamilex Baron MD;  Location: [facility]     COLONOSCOPY N/A 12/4/2018    Procedure: COLONOSCOPY;  Surgeon: Yamilex Baron MD;  Location: [facility]     COLONOSCOPY N/A 12/8/2020    Procedure: COLONOSCOPY;  Surgeon: Yamilex Baron MD;  Location: [facility]     MAMMOPLASTY AUGMENTATION       uterine ablation         EXAM:  BP: 127/88   Pulse: 82    Temp: Data Unavailable    Wt Readings from Last 2 Encounters:   05/30/23 67 kg (147 lb 9.6 oz)   09/29/22 66.6 kg (146 lb 12.8 oz)       BMI= Body mass index is 24.19 kg/m .    EXAM:  APPEARANCE: = Relaxed and in no distress  Resp effort = Calm regular breathing  SKIN: 11-3 oclock on the top of the left implant is hard.      Assessment/Plan:  Lisbeth was seen today for breast pain.    Diagnoses and all orders for this visit:    Major depressive disorder with single episode, in full remission (H)  Stable and has been doing well.  Leakage of breast implant, initial encounter  Will get MRI of breast.    COUNSELING:   reports that she has  "never smoked. She has never used smokeless tobacco.    Estimated body mass index is 24.19 kg/m  as calculated from the following:    Height as of this encounter: 1.664 m (5' 5.5\").    Weight as of this encounter: 67 kg (147 lb 9.6 oz).       Appropriate preventive services were discussed with this patient, including applicable screening as appropriate for cardiovascular disease, diabetes, osteopenia/osteoporosis, and glaucoma.  As appropriate for age/gender, discussed screening for colorectal cancer, prostate cancer, breast cancer, and cervical cancer. Checklist reviewing preventive services available has been given to the patient.    Reviewed patients plan of care and provided an AVS. The Basic Care Plan (routine screening as documented in Health Maintenance) for Lisbeth meets the Care Plan requirement. This Care Plan has been established and reviewed with the  Patient.      The following health maintenance items are reviewed in Epic and correct as of today:  Health Maintenance   Topic Date Due     HIV SCREENING  Never done     ZOSTER IMMUNIZATION (2 of 3) 04/19/2016     COVID-19 Vaccine (4 - Moderna series) 02/03/2022     INFLUENZA VACCINE (1) 09/01/2022     COLORECTAL CANCER SCREENING  12/08/2022     PHQ-9  03/29/2023     PAP  09/29/2023     YEARLY PREVENTIVE VISIT  09/29/2023     MAMMO SCREENING  12/23/2024     ADVANCE CARE PLANNING  09/28/2025     LIPID  09/29/2027     DTAP/TDAP/TD IMMUNIZATION (3 - Td or Tdap) 09/29/2032     HEPATITIS C SCREENING  Completed     DEPRESSION ACTION PLAN  Completed     Pneumococcal Vaccine: Pediatrics (0 to 5 Years) and At-Risk Patients (6 to 64 Years)  Aged Out     IPV IMMUNIZATION  Aged Out     MENINGITIS IMMUNIZATION  Aged Out       Jean Horowitz  [facility]  For any issues my office # is [phone]                "

## 2023-06-05 ENCOUNTER — HOSPITAL ENCOUNTER (OUTPATIENT)
Dept: MRI IMAGING | Facility: CLINIC | Age: 63
Discharge: HOME | End: 2023-06-05
Attending: FAMILY MEDICINE | Admitting: FAMILY MEDICINE
Payer: COMMERCIAL

## 2023-06-05 DIAGNOSIS — T85.43XA LEAKAGE OF BREAST IMPLANT, INITIAL ENCOUNTER: ICD-10-CM

## 2023-06-05 PROCEDURE — 77049 MRI BREAST C-+ W/CAD BI: CPT

## 2023-06-06 ENCOUNTER — TELEPHONE (OUTPATIENT)
Dept: FAMILY MEDICINE | Facility: CLINIC | Age: 63
End: 2023-06-06

## 2023-06-06 DIAGNOSIS — T85.43XA RUPTURED LEFT BREAST IMPLANT: Primary | ICD-10-CM

## 2023-06-06 NOTE — CONFIDENTIAL NOTE
Per Dr. Horowitz, called patient with MR breasts results indicating left implant rupture and possible right implant rupture. Patient aware as saw result on MyChart.   Plastic Surgery consult scheduled with Dr. Thelma Rivera for 6/14/23 at 1130am. Referral and MR report faxed to their office at fax: [phone].   Judi Foster RN

## 2023-09-14 DIAGNOSIS — F41.9 ANXIETY: ICD-10-CM

## 2023-09-14 RX ORDER — ALPRAZOLAM 0.5 MG
TABLET ORAL
Qty: 30 TABLET | Refills: 3 | Status: SHIPPED | OUTPATIENT
Start: 2023-09-14 | End: 2024-01-10

## 2023-09-14 NOTE — TELEPHONE ENCOUNTER
CONTROLLED SUBSTANCE REFILL REQUEST FOR Alprazolam    DOSE: 0.5 mg - # 30    SIG: TAKE 1 TABLET (0.5 MG) BY MOUTH THREE TIMES DAILY AS NEEDED FOR ANXIETY       LAST REFILL: 8/18/23    LAST APPT RELATED: 9/29/22    NEXT APPT: 9/26/23 with Carlos Manuel        CONTROLLED SUBSTANCE AGREEMENT: None       URINE DRUG SCREEN: None           FILL HISTORY PER :          REFILL ROUTED TO Carlos Manuel FOR REVIEW    
Medication(s) approved  KN  
.

## 2023-09-25 NOTE — PATIENT INSTRUCTIONS
Problem: Anemia  Goal: Anemia Symptom Improvement  Outcome: Ongoing, Progressing  Intervention: Monitor and Manage Anemia  Flowsheets (Taken 4/27/2023 1601)  Oral Nutrition Promotion: rest periods promoted  Safety Promotion/Fall Prevention: medications reviewed  Fatigue Management: frequent rest breaks encouraged      Preparing for Your Surgery  Getting started  A nurse will call you to review your health history and instructions. They will give you an arrival time based on your scheduled surgery time. Please be ready to share:  Your doctor's clinic name and phone number  Your medical, surgical, and anesthesia history  A list of allergies and sensitivities  A list of medicines, including herbal treatments and over-the-counter drugs  Whether the patient has a legal guardian (ask how to send us the papers in advance)  Please tell us if you're pregnant--or if there's any chance you might be pregnant. Some surgeries may injure a fetus (unborn baby), so they require a pregnancy test. Surgeries that are safe for a fetus don't always need a test, and you can choose whether to have one.   If you have a child who's having surgery, please ask for a copy of Preparing for Your Child's Surgery.    Preparing for surgery  Within 10 to 30 days of surgery: Have a pre-op exam (sometimes called an H&P, or History and Physical). This can be done at a clinic or pre-operative center.  If you're having a , you may not need this exam. Talk to your care team.  At your pre-op exam, talk to your care team about all medicines you take. If you need to stop any medicines before surgery, ask when to start taking them again.  We do this for your safety. Many medicines can make you bleed too much during surgery. Some change how well surgery (anesthesia) drugs work.  Call your insurance company to let them know you're having surgery. (If you don't have insurance, call [phone].)  Call your clinic if there's any change in your health. This includes signs of a cold or flu (sore throat, runny nose, cough, rash, fever). It also includes a scrape or scratch near the surgery site.  If you have questions on the day of surgery, call your hospital or surgery center.  Eating and drinking guidelines  For your safety: Unless your surgeon tells you otherwise,  follow the guidelines below.  Eat and drink as usual until 8 hours before you arrive for surgery. After that, no food or milk.  Drink clear liquids until 2 hours before you arrive. These are liquids you can see through, like water, Gatorade, and Propel Water. They also include plain black coffee and tea (no cream or milk), candy, and breath mints. You can spit out gum when you arrive.  If you drink alcohol: Stop drinking it the night before surgery.  If your care team tells you to take medicine on the morning of surgery, it's okay to take it with a sip of water.  Preventing infection  Shower or bathe the night before and morning of your surgery. Follow the instructions your clinic gave you. (If no instructions, use regular soap.)  Don't shave or clip hair near your surgery site. We'll remove the hair if needed.  Don't smoke or vape the morning of surgery. You may chew nicotine gum up to 2 hours before surgery. A nicotine patch is okay.  Note: Some surgeries require you to completely quit smoking and nicotine. Check with your surgeon.  Your care team will make every effort to keep you safe from infection. We will:  Clean our hands often with soap and water (or an alcohol-based hand rub).  Clean the skin at your surgery site with a special soap that kills germs.  Give you a special gown to keep you warm. (Cold raises the risk of infection.)  Wear special hair covers, masks, gowns and gloves during surgery.  Give antibiotic medicine, if prescribed. Not all surgeries need antibiotics.  What to bring on the day of surgery  Photo ID and insurance card  Copy of your health care directive, if you have one  Glasses and hearing aids (bring cases)  You can't wear contacts during surgery  Inhaler and eye drops, if you use them (tell us about these when you arrive)  CPAP machine or breathing device, if you use them  A few personal items, if spending the night  If you have . . .  A pacemaker, ICD (cardiac defibrillator) or other  implant: Bring the ID card.  An implanted stimulator: Bring the remote control.  A legal guardian: Bring a copy of the certified (court-stamped) guardianship papers.  Please remove any jewelry, including body piercings. Leave jewelry and other valuables at home.  If you're going home the day of surgery  You must have a responsible adult drive you home. They should stay with you overnight as well.  If you don't have someone to stay with you, and you aren't safe to go home alone, we may keep you overnight. Insurance often won't pay for this.  After surgery  If it's hard to control your pain or you need more pain medicine, please call your surgeon's office.  Questions?   If you have any questions for your care team, list them here: _________________________________________________________________________________________________________________________________________________________________________ ____________________________________ ____________________________________ ____________________________________  For informational purposes only. Not to replace the advice of your health care provider. Copyright   2003, 2019 [facility]. All rights reserved. Clinically reviewed by Tanya Hernandez MD. [employer] 726799 - REV 12/22.    How to Take Your Medication Before Surgery  - Take all of your medications before surgery as usual

## 2023-09-25 NOTE — PROGRESS NOTES
[facility] [address] [place] [address]  Phone: [phone]  Fax: [phone]  Primary Provider: Jaun Horowitz  Pre-op Performing Provider: JAUN HOROWITZ    PREOPERATIVE EVALUATION:  Today's date: 9/26/2023    Lisbeth is a 63 year old female who presents for a preoperative evaluation.    Surgical Information:  Surgery/Procedure: both breast , explant/capsulectomies  Surgery Location: [facility]   Surgeon: Jenny Alanis   Surgery Date: 10/19/23  Time of Surgery: 7:30 am  Where patient plans to recover: At home with family  Fax number for surgical facility: [phone] just in case [phone]    Assessment & Plan     The proposed surgical procedure is considered LOW risk.    Preop general physical exam  insight  Capsular contracture of breast implant, subsequent encounter    Major depressive disorder with single episode, in full remission (H)  Doing great.     - No identified additional risk factors other than previously addressed    Antiplatelet or Anticoagulation Medication Instructions:   - Patient is on no antiplatelet or anticoagulation medications.    Additional Medication Instructions:  Patient is to take all scheduled medications on the day of surgery    RECOMMENDATION:  APPROVAL GIVEN to proceed with proposed procedure, without further diagnostic evaluation.    Subjective     HPI related to upcoming procedure: Has had capsular contraction.         9/25/2023     9:26 AM   Preop Questions   1. Have you ever had a heart attack or stroke? No   2. Have you ever had surgery on your heart or blood vessels, such as a stent placement, a coronary artery bypass, or surgery on an artery in your head, neck, heart, or legs? No   3. Do you have chest pain with activity? No   4. Do you have a history of  heart failure? UNKNOWN -    5. Do you currently have a cold, bronchitis or symptoms of other infection? No   6. Do you have a cough, shortness of breath,  or wheezing? No   7. Do you or anyone in your family have previous history of blood clots? No   8. Do you or does anyone in your family have a serious bleeding problem such as prolonged bleeding following surgeries or cuts? No   9. Have you ever had problems with anemia or been told to take iron pills? No   10. Have you had any abnormal blood loss such as black, tarry or bloody stools, or abnormal vaginal bleeding? No   11. Have you ever had a blood transfusion? No   12. Are you willing to have a blood transfusion if it is medically needed before, during, or after your surgery? Yes   13. Have you or any of your relatives ever had problems with anesthesia? UNKNOWN -    14. Do you have sleep apnea, excessive snoring or daytime drowsiness? No   15. Do you have any artifical heart valves or other implanted medical devices like a pacemaker, defibrillator, or continuous glucose monitor? No   16. Do you have artificial joints? No   17. Are you allergic to latex? No       Health Care Directive:  Patient does not have a Health Care Directive or Living Will: Discussed advance care planning with patient; however, patient declined at this time.    Preoperative Review of :   reviewed - no record of controlled substances prescribed.      Status of Chronic Conditions:  See problem list for active medical problems.  Problems all longstanding and stable, except as noted/documented.  See ROS for pertinent symptoms related to these conditions.    Review of Systems  Constitutional, neuro, ENT, endocrine, pulmonary, cardiac, gastrointestinal, genitourinary, musculoskeletal, integument and psychiatric systems are negative, except as otherwise noted.    Patient Active Problem List    Diagnosis Date Noted    Major depressive disorder with single episode, in full remission (H) 05/30/2023     Priority: Medium    HPV in female 09/27/2019     Priority: Medium    Family history of Orellana syndrome 08/21/2017     Priority: Medium    Family  history of early CAD 06/18/2014     Priority: Medium    Family history of rectal cancer 04/23/2012     Priority: Medium    Acid indigestion      Priority: Medium    History of migraine      Priority: Medium     (Problem list name updated by automated process. Provider to review and confirm.)      Genital herpes      Priority: Medium     Problem list name updated by automated process. Provider to review      Anxiety      Priority: Medium      Past Medical History:   Diagnosis Date    Acid indigestion     Small hiatal hernia    Anxiety     Family history of Orellana syndrome     In her sister.  EGD unremarkable with the exception of a small hiatal hernia 2016    Genital herpes, unspecified     GERD (gastroesophageal reflux disease)     History of migraines      Past Surgical History:   Procedure Laterality Date    COLONOSCOPY  11/21/2012    Procedure: COLONOSCOPY;;  Surgeon: Yamilex Baron MD;  Location:  GI    COLONOSCOPY N/A 11/26/2014    Procedure: COLONOSCOPY;  Surgeon: Yamilex Baron MD;  Location: [facility]    COLONOSCOPY N/A 11/30/2016    Procedure: COMBINED COLONOSCOPY, SINGLE OR MULTIPLE BIOPSY/POLYPECTOMY BY BIOPSY;  Surgeon: Yamilex Baron MD;  Location: [facility]    COLONOSCOPY N/A 12/04/2018    Procedure: COLONOSCOPY;  Surgeon: Yamilex Baron MD;  Location:  GI    COLONOSCOPY N/A 12/08/2020    Procedure: COLONOSCOPY;  Surgeon: Yamilex Baron MD;  Location: [facility]    deviated septum repair  2018    MAMMOPLASTY AUGMENTATION      uterine ablation       Current Outpatient Medications   Medication Sig Dispense Refill    acyclovir (ZOVIRAX) 400 MG tablet TAKE 1 TABLET (400 MG) BY MOUTH 2 TIMES DAILY 180 tablet 3    ALPRAZolam (XANAX) 0.5 MG tablet TAKE 1 TABLET (0.5 MG) BY MOUTH THREE TIMES DAILY AS NEEDED FOR ANXIETY 30 tablet 3    imipramine (TOFRANIL) 50 MG tablet TAKE 2 TABLETS BY MOUTH EVERY NIGHT AT BEDTIME 180 tablet 3    multivitamin w/minerals (THERA-VIT-M) tablet Take 1  tablet by mouth daily      Omega-3 Fatty Acids (FISH OIL OMEGA-3 PO)          Allergies   Allergen Reactions    Codeine Sulfate         Social History     Tobacco Use    Smoking status: Never    Smokeless tobacco: Never   Substance Use Topics    Alcohol use: Yes     Alcohol/week: 5.0 - 7.0 standard drinks of alcohol     Types: 5 - 7 Glasses of wine per week     Comment: 3/ week     Family History   Problem Relation Age of Onset    Thyroid Disease Mother     Coronary Artery Disease Mother     Colon Cancer Father     C.A.D. Maternal Grandfather 65    Colon Polyps Brother     Colon Cancer Sister         Orellana syndrome    Colon Cancer Sister 57    Autism Spectrum Disorder Sister      History   Drug Use No         Objective     /72   Pulse 85   Temp 98.1  F (36.7  C) (Oral)   Wt 63.3 kg (139 lb 9.6 oz)   LMP 08/24/2008 (Approximate)   SpO2 94%   BMI 22.88 kg/m      Physical Exam    GENERAL APPEARANCE: healthy, alert and no distress     EYES: EOMI, PERRL     HENT: ear canals and TM's normal and nose and mouth without ulcers or lesions     NECK: no adenopathy, no asymmetry, masses, or scars and thyroid normal to palpation     RESP: lungs clear to auscultation - no rales, rhonchi or wheezes     CV: regular rates and rhythm, normal S1 S2, no S3 or S4 and no murmur, click or rub     ABDOMEN:  soft, nontender, no HSM or masses and bowel sounds normal     MS: extremities normal- no gross deformities noted, no evidence of inflammation in joints, FROM in all extremities.     SKIN: no suspicious lesions or rashes     NEURO: Normal strength and tone, sensory exam grossly normal, mentation intact and speech normal     PSYCH: mentation appears normal. and affect normal/bright     LYMPHATICS: No cervical adenopathy    Recent Labs   Lab Test 09/29/22  1050 09/29/22  1035 10/01/21  1145 10/01/21  0000   HGB 14.1  --   --  14.0     --   --  268   NA  --  139 141  --    POTASSIUM  --  4.3 4.3  --    CR  --  0.97 0.88   --       Diagnostics:  No labs were ordered during this visit.   No EKG required for low risk surgery (cataract, skin procedure, breast biopsy, etc).    Revised Cardiac Risk Index (RCRI):  The patient has the following serious cardiovascular risks for perioperative complications:   - No serious cardiac risks = 0 points     RCRI Interpretation: 0 points: Class I (very low risk - 0.4% complication rate)         Signed Electronically by: Jean Horowitz MD  Copy of this evaluation report is provided to requesting physician.

## 2023-09-26 ENCOUNTER — OFFICE VISIT (OUTPATIENT)
Dept: FAMILY MEDICINE | Facility: CLINIC | Age: 63
End: 2023-09-26

## 2023-09-26 VITALS
WEIGHT: 139.6 LBS | SYSTOLIC BLOOD PRESSURE: 129 MMHG | HEART RATE: 85 BPM | TEMPERATURE: 98.1 F | OXYGEN SATURATION: 94 % | DIASTOLIC BLOOD PRESSURE: 72 MMHG | BODY MASS INDEX: 22.88 KG/M2

## 2023-09-26 DIAGNOSIS — F32.5 MAJOR DEPRESSIVE DISORDER WITH SINGLE EPISODE, IN FULL REMISSION (H): ICD-10-CM

## 2023-09-26 DIAGNOSIS — T85.44XD CAPSULAR CONTRACTURE OF BREAST IMPLANT, SUBSEQUENT ENCOUNTER: ICD-10-CM

## 2023-09-26 DIAGNOSIS — Z01.818 PREOP GENERAL PHYSICAL EXAM: Primary | ICD-10-CM

## 2023-09-26 PROBLEM — F32.9 MAJOR DEPRESSION: Status: RESOLVED | Noted: 2019-09-27 | Resolved: 2023-09-26

## 2023-09-26 PROCEDURE — 99214 OFFICE O/P EST MOD 30 MIN: CPT | Performed by: FAMILY MEDICINE

## 2023-09-29 NOTE — PROGRESS NOTES
SUBJECTIVE:   CC: Lisbeth Vaughn is an 63 year old woman who presents for preventive health visit.       Patient has been advised of split billing requirements and indicates understanding: Yes  Healthy Habits:  Do you get at least three servings of calcium containing foods daily (dairy, green leafy vegetables, etc.)? yes  Amount of exercise or daily activities, outside of work: 3-4 day(s) per week  Problems taking medications regularly No  Medication side effects: No  Have you had an eye exam in the past two years? yes  Do you see a dentist twice per year? yes  Do you have sleep apnea, excessive snoring or daytime drowsiness?no      PROBLEMS TO ADD ON...  Really just needs pap    Today's PHQ-2 Score:       9/25/2022     9:53 PM 10/1/2021    10:33 AM   PHQ-2 ( 1999 [employer])   Q1: Little interest or pleasure in doing things 0 0   Q2: Feeling down, depressed or hopeless 0 1   PHQ-2 Score 0 1   PHQ-2 Total Score (12-17 Years)- Positive if 3 or more points; Administer PHQ-A if positive  1   Q1: Little interest or pleasure in doing things Not at all    Q2: Feeling down, depressed or hopeless Not at all    PHQ-2 Score 0        Abuse: Current or Past(Physical, Sexual or Emotional)- No  Do you feel safe in your environment? Yes        Social History     Tobacco Use    Smoking status: Never    Smokeless tobacco: Never   Substance Use Topics    Alcohol use: Yes     Alcohol/week: 5.0 - 7.0 standard drinks of alcohol     Types: 5 - 7 Glasses of wine per week     Comment: 3/ week     If you drink alcohol do you typically have >3 drinks per day or >7 drinks per week? No                     Reviewed orders with patient.  Reviewed health maintenance and updated orders accordingly - Yes  Lab work is in process    FHS-7:       12/17/2021    11:39 AM 12/17/2021    11:40 AM 9/25/2022     9:55 PM 12/23/2022    12:18 PM 9/25/2023     9:29 AM   Breast CA Risk Assessment (FHS-7)   Did any of your first-degree relatives have  breast or ovarian cancer?  No No No No    No   Did any of your relatives have bilateral breast cancer?  No No No Unknown    Unknown   Did any man in your family have breast cancer?  No No No Yes    Yes   Did any woman in your family have breast and ovarian cancer?  No No No Unknown    Unknown   Did any woman in your family have breast cancer before age 50 y?  No No No Unknown    Unknown   Do you have 2 or more relatives with breast and/or ovarian cancer?  No No No Unknown    Unknown   Do you have 2 or more relatives with breast and/or bowel cancer? Yes  Yes Yes Yes    Yes       Mammogram Screening: Recommended mammography every 1-2 years with patient discussion and risk factor consideration  Pertinent mammograms are reviewed under the imaging tab.    Pertinent mammograms are reviewed under the imaging tab.  History of abnormal Pap smear: NO - age 30-65 PAP every 5 years with negative HPV co-testing recommended     Reviewed and updated as needed this visit by clinical staff                  Reviewed and updated as needed this visit by Provider                 Past Medical History:   Diagnosis Date    Acid indigestion     Small hiatal hernia    Anxiety     Family history of Orellana syndrome     In her sister.  EGD unremarkable with the exception of a small hiatal hernia 2016    Genital herpes, unspecified     GERD (gastroesophageal reflux disease)     History of migraines       Past Surgical History:   Procedure Laterality Date    COLONOSCOPY  11/21/2012    Procedure: COLONOSCOPY;;  Surgeon: Yamilex Baron MD;  Location: [facility]    COLONOSCOPY N/A 11/26/2014    Procedure: COLONOSCOPY;  Surgeon: Yamilex Baron MD;  Location: [facility]    COLONOSCOPY N/A 11/30/2016    Procedure: COMBINED COLONOSCOPY, SINGLE OR MULTIPLE BIOPSY/POLYPECTOMY BY BIOPSY;  Surgeon: Yamilex Baron MD;  Location: [facility]    COLONOSCOPY N/A 12/04/2018    Procedure: COLONOSCOPY;  Surgeon: Yamilex Baron MD;  Location: [facility]     COLONOSCOPY N/A 12/08/2020    Procedure: COLONOSCOPY;  Surgeon: Yamilex Baron MD;  Location:  GI    deviated septum repair  2018    MAMMOPLASTY AUGMENTATION      uterine ablation         ROS:  CONSTITUTIONAL: NEGATIVE for fever, chills, change in weight  INTEGUMENTARY/SKIN: NEGATIVE for worrisome rashes, moles or lesions  EYES: NEGATIVE for vision changes or irritation  ENT: NEGATIVE for ear, mouth and throat problems  RESP: NEGATIVE for significant cough or SOB  BREAST: NEGATIVE for masses, tenderness or discharge  CV: NEGATIVE for chest pain, palpitations or peripheral edema  GI: NEGATIVE for nausea, abdominal pain, heartburn, or change in bowel habits  : NEGATIVE for unusual urinary or vaginal symptoms. No vaginal bleeding.  MUSCULOSKELETAL: NEGATIVE for significant arthralgias or myalgia  NEURO: NEGATIVE for weakness, dizziness or paresthesias  PSYCHIATRIC: NEGATIVE for changes in mood or affect     OBJECTIVE:   LMP 08/24/2008 (Approximate)   EXAM:  GENERAL APPEARANCE: healthy, alert and no distress  EYES: Eyes grossly normal to inspection, PERRL and conjunctivae and sclerae normal  HENT: ear canals and TM's normal, nose and mouth without ulcers or lesions, oropharynx clear and oral mucous membranes moist  NECK: no adenopathy, no asymmetry, masses, or scars and thyroid normal to palpation  RESP: lungs clear to auscultation - no rales, rhonchi or wheezes  BREAST: normal without masses, tenderness or nipple discharge and no palpable axillary masses or adenopathy  CV: regular rate and rhythm, normal S1 S2, no S3 or S4, no murmur, click or rub, no peripheral edema and peripheral pulses strong  ABDOMEN: soft, nontender, no hepatosplenomegaly, no masses and bowel sounds normal   (female): normal female external genitalia, normal urethral meatus, vaginal mucosal atrophy noted, and normal cervix, adnexae, and uterus without masses.  MS: no musculoskeletal defects are noted and gait is age appropriate  "without ataxia  SKIN: no suspicious lesions or rashes  NEURO: Normal strength and tone, sensory exam grossly normal, mentation intact and speech normal  PSYCH: mentation appears normal and affect normal/bright    Diagnostic Test Results:  Labs reviewed in Epic    ASSESSMENT/PLAN:   Lisbeth was seen today for physical, hearing screening, vaginal problem, cold extremity and derm problem.    Diagnoses and all orders for this visit:    Routine general medical examination at a health care facility  -     VENOUS COLLECTION    HPV in female  -     CBC with Diff/Plt ([facility])  -     Gynecologic Cytology (PAP); Future    Family history of early CAD    Screening for heart disease  -     Comprehensive metabolic panel; Future  -     Lipid Profile ([facility])    Screening for HIV without presence of risk factors  -     HIV Antigen Antibody Combo Cascade; Future        Patient has been advised of split billing requirements and indicates understanding: Yes  COUNSELING:   Reviewed preventive health counseling, as reflected in patient instructions       Regular exercise    Estimated body mass index is 22.88 kg/m  as calculated from the following:    Height as of 5/30/23: 1.664 m (5' 5.5\").    Weight as of 9/26/23: 63.3 kg (139 lb 9.6 oz).        She reports that she has never smoked. She has never used smokeless tobacco.      Counseling Resources:  ATP IV Guidelines  Pooled Cohorts Equation Calculator  Breast Cancer Risk Calculator  BRCA-Related Cancer Risk Assessment: FHS-7 Tool  FRAX Risk Assessment  ICSI Preventive Guidelines  Dietary Guidelines for Americans, 2010  USDA's MyPlate  ASA Prophylaxis  Lung CA Screening    Jean Horowitz MD  [facility]  "

## 2023-10-02 ENCOUNTER — OFFICE VISIT (OUTPATIENT)
Dept: FAMILY MEDICINE | Facility: CLINIC | Age: 63
End: 2023-10-02

## 2023-10-02 VITALS
HEART RATE: 80 BPM | DIASTOLIC BLOOD PRESSURE: 75 MMHG | OXYGEN SATURATION: 98 % | BODY MASS INDEX: 22.62 KG/M2 | WEIGHT: 138 LBS | SYSTOLIC BLOOD PRESSURE: 136 MMHG

## 2023-10-02 DIAGNOSIS — Z82.49 FAMILY HISTORY OF EARLY CAD: ICD-10-CM

## 2023-10-02 DIAGNOSIS — B97.7 HPV IN FEMALE: ICD-10-CM

## 2023-10-02 DIAGNOSIS — Z11.4 SCREENING FOR HIV WITHOUT PRESENCE OF RISK FACTORS: ICD-10-CM

## 2023-10-02 DIAGNOSIS — Z13.6 SCREENING FOR HEART DISEASE: ICD-10-CM

## 2023-10-02 DIAGNOSIS — Z00.00 ROUTINE GENERAL MEDICAL EXAMINATION AT A HEALTH CARE FACILITY: Primary | ICD-10-CM

## 2023-10-02 LAB
% GRANULOCYTES: 58.9 % (ref 42.2–75.2)
ALBUMIN SERPL BCG-MCNC: 4.5 G/DL (ref 3.5–5.2)
ALP SERPL-CCNC: 88 U/L (ref 35–104)
ALT SERPL W P-5'-P-CCNC: 22 U/L (ref 0–50)
ANION GAP SERPL CALCULATED.3IONS-SCNC: 11 MMOL/L (ref 7–15)
AST SERPL W P-5'-P-CCNC: 28 U/L (ref 0–45)
BILIRUB SERPL-MCNC: 1.1 MG/DL
BUN SERPL-MCNC: 13.9 MG/DL (ref 8–23)
CALCIUM SERPL-MCNC: 9.6 MG/DL (ref 8.8–10.2)
CHLORIDE SERPL-SCNC: 103 MMOL/L (ref 98–107)
CHOLESTEROL: 228 MG/DL (ref 100–199)
CREAT SERPL-MCNC: 0.87 MG/DL (ref 0.51–0.95)
DEPRECATED HCO3 PLAS-SCNC: 26 MMOL/L (ref 22–29)
EGFRCR SERPLBLD CKD-EPI 2021: 74 ML/MIN/1.73M2
FASTING?: YES
GLUCOSE SERPL-MCNC: 95 MG/DL (ref 70–99)
HCT VFR BLD AUTO: 43.7 % (ref 35–46)
HDL (RMG): 51 MG/DL (ref 40–?)
HEMOGLOBIN: 14.2 G/DL (ref 11.8–15.5)
LDL CALCULATED (RMG): 162 MG/DL (ref 0–130)
LYMPHOCYTES NFR BLD AUTO: 33.3 % (ref 20.5–51.1)
MCH RBC QN AUTO: 30.8 PG (ref 27–31)
MCHC RBC AUTO-ENTMCNC: 32.4 G/DL (ref 33–37)
MCV RBC AUTO: 94.9 FL (ref 80–100)
MONOCYTES NFR BLD AUTO: 7.8 % (ref 1.7–9.3)
PLATELET # BLD AUTO: 300 K/UL (ref 140–450)
POTASSIUM SERPL-SCNC: 4.6 MMOL/L (ref 3.4–5.3)
PROT SERPL-MCNC: 7.8 G/DL (ref 6.4–8.3)
RBC # BLD AUTO: 4.6 X10/CMM (ref 3.7–5.2)
SODIUM SERPL-SCNC: 140 MMOL/L (ref 135–145)
TRIGLYCERIDES (RMG): 72 MG/DL (ref 0–149)
WBC # BLD AUTO: 4.2 X10/CMM (ref 3.8–11)

## 2023-10-02 PROCEDURE — 87389 HIV-1 AG W/HIV-1&-2 AB AG IA: CPT | Performed by: FAMILY MEDICINE

## 2023-10-02 PROCEDURE — 87624 HPV HI-RISK TYP POOLED RSLT: CPT | Performed by: FAMILY MEDICINE

## 2023-10-02 PROCEDURE — 80061 LIPID PANEL: CPT | Mod: QW | Performed by: FAMILY MEDICINE

## 2023-10-02 PROCEDURE — 80053 COMPREHEN METABOLIC PANEL: CPT | Performed by: FAMILY MEDICINE

## 2023-10-02 PROCEDURE — 36415 COLL VENOUS BLD VENIPUNCTURE: CPT | Performed by: FAMILY MEDICINE

## 2023-10-02 PROCEDURE — 99396 PREV VISIT EST AGE 40-64: CPT | Performed by: FAMILY MEDICINE

## 2023-10-02 PROCEDURE — G0145 SCR C/V CYTO,THINLAYER,RESCR: HCPCS | Performed by: FAMILY MEDICINE

## 2023-10-02 PROCEDURE — 85025 COMPLETE CBC W/AUTO DIFF WBC: CPT | Performed by: FAMILY MEDICINE

## 2023-10-03 LAB — HIV 1+2 AB+HIV1 P24 AG SERPL QL IA: NONREACTIVE

## 2023-10-04 LAB
BKR LAB AP GYN ADEQUACY: NORMAL
BKR LAB AP GYN INTERPRETATION: NORMAL
BKR LAB AP HPV REFLEX: NORMAL
BKR LAB AP PREVIOUS ABNL DX: NORMAL
BKR LAB AP PREVIOUS ABNORMAL: NORMAL
PATH REPORT.COMMENTS IMP SPEC: NORMAL
PATH REPORT.COMMENTS IMP SPEC: NORMAL
PATH REPORT.RELEVANT HX SPEC: NORMAL

## 2023-10-09 NOTE — PROGRESS NOTES
9/26/23 faxed this preop to [facility] @ [phone]    Connor Shen,   [facility]  [phone]

## 2023-10-10 LAB
HUMAN PAPILLOMA VIRUS 16 DNA: NEGATIVE
HUMAN PAPILLOMA VIRUS 18 DNA: NEGATIVE
HUMAN PAPILLOMA VIRUS FINAL DIAGNOSIS: ABNORMAL
HUMAN PAPILLOMA VIRUS OTHER HR: POSITIVE

## 2023-10-18 NOTE — PROGRESS NOTES
10/13/23 faxed this preop and labs to [facility] @ [phone]    Connor Shen,   [facility]  [phone]

## 2023-10-20 ENCOUNTER — TELEPHONE (OUTPATIENT)
Dept: FAMILY MEDICINE | Facility: CLINIC | Age: 63
End: 2023-10-20

## 2023-10-20 DIAGNOSIS — F41.9 ANXIETY: ICD-10-CM

## 2023-10-20 NOTE — TELEPHONE ENCOUNTER
Dr. Horowitz recommends repeat pap in one year.   Left message for patient to call nurse regarding results.  Judi Foster RN

## 2023-10-20 NOTE — TELEPHONE ENCOUNTER
Med: Imipramine 50mg    LOV (related): 10/2/23 with Dr. Horowitz       Due for F/U around: 10/2/2024 for CPX    Next Appt: none        10/1/2021    10:38 AM 9/29/2022     9:52 AM 5/30/2023     3:49 PM   PHQ   PHQ-9 Total Score 4 5 2   Q9: Thoughts of better off dead/self-harm past 2 weeks Not at all Not at all Not at all           10/1/2021    10:38 AM 9/29/2022     9:52 AM 5/30/2023     3:49 PM   EYAL-7 SCORE   Total Score 7 1 1

## 2023-10-20 NOTE — TELEPHONE ENCOUNTER
10/20/23 3:01 PM   Patient calls back. Reviewed pap result and recommendation to repeat in one year.   Patient does have hx of HPV positive paps in 2016 and 2020. In 2020, we referred for colposcopy. Patient reports did have colpo and was negative with plan to repeat paps q year.   Patient agrees to plan for continued annual pap smears.  Judi Foster RN

## 2023-10-22 RX ORDER — IMIPRAMINE HCL 50 MG
TABLET ORAL
Qty: 180 TABLET | Refills: 3 | Status: SHIPPED | OUTPATIENT
Start: 2023-10-22

## 2024-01-10 DIAGNOSIS — F41.9 ANXIETY: ICD-10-CM

## 2024-01-10 RX ORDER — ALPRAZOLAM 0.5 MG
TABLET ORAL
Qty: 30 TABLET | Refills: 3 | Status: SHIPPED | OUTPATIENT
Start: 2024-01-10 | End: 2024-05-10

## 2024-05-10 DIAGNOSIS — F41.9 ANXIETY: ICD-10-CM

## 2024-05-10 RX ORDER — ALPRAZOLAM 0.5 MG
TABLET ORAL
Qty: 30 TABLET | Refills: 3 | Status: SHIPPED | OUTPATIENT
Start: 2024-05-10 | End: 2024-09-15

## 2024-05-10 NOTE — TELEPHONE ENCOUNTER
Med: ALPRAZolam (XANAX) 0.5 MG tablet     LOV (related): 10/2/23 cpx      Due for F/U around: Oct 2024    Next Appt: none            10/1/2021    10:38 AM 9/29/2022     9:52 AM 5/30/2023     3:49 PM   PHQ   PHQ-9 Total Score 4 5 2   Q9: Thoughts of better off dead/self-harm past 2 weeks Not at all Not at all Not at all           10/1/2021    10:38 AM 9/29/2022     9:52 AM 5/30/2023     3:49 PM   EYAL-7 SCORE   Total Score 7 1 1

## 2024-06-20 NOTE — PATIENT INSTRUCTIONS
How to Take Your Medication Before Surgery  Preoperative Medication Instructions        Patient Education   Preparing for Your Surgery  Getting started  A nurse will call you to review your health history and instructions. They will give you an arrival time based on your scheduled surgery time. Please be ready to share:  Your doctor's clinic name and phone number  Your medical, surgical, and anesthesia history  A list of allergies and sensitivities  A list of medicines, including herbal treatments and over-the-counter drugs  Whether the patient has a legal guardian (ask how to send us the papers in advance)  Please tell us if you're pregnant--or if there's any chance you might be pregnant. Some surgeries may injure a fetus (unborn baby), so they require a pregnancy test. Surgeries that are safe for a fetus don't always need a test, and you can choose whether to have one.   If you have a child who's having surgery, please ask for a copy of Preparing for Your Child's Surgery.    Preparing for surgery  Within 10 to 30 days of surgery: Have a pre-op exam (sometimes called an H&P, or History and Physical). This can be done at a clinic or pre-operative center.  If you're having a , you may not need this exam. Talk to your care team.  At your pre-op exam, talk to your care team about all medicines you take. If you need to stop any medicines before surgery, ask when to start taking them again.  We do this for your safety. Many medicines can make you bleed too much during surgery. Some change how well surgery (anesthesia) drugs work.  Call your insurance company to let them know you're having surgery. (If you don't have insurance, call [phone].)  Call your clinic if there's any change in your health. This includes signs of a cold or flu (sore throat, runny nose, cough, rash, fever). It also includes a scrape or scratch near the surgery site.  If you have questions on the day of surgery, call your hospital or  surgery center.  Eating and drinking guidelines  For your safety: Unless your surgeon tells you otherwise, follow the guidelines below.  Eat and drink as usual until 8 hours before you arrive for surgery. After that, no food or milk.  Drink clear liquids until 2 hours before you arrive. These are liquids you can see through, like water, Gatorade, and Propel Water. They also include plain black coffee and tea (no cream or milk), candy, and breath mints. You can spit out gum when you arrive.  If you drink alcohol: Stop drinking it the night before surgery.  If your care team tells you to take medicine on the morning of surgery, it's okay to take it with a sip of water.  Preventing infection  Shower or bathe the night before and morning of your surgery. Follow the instructions your clinic gave you. (If no instructions, use regular soap.)  Don't shave or clip hair near your surgery site. We'll remove the hair if needed.  Don't smoke or vape the morning of surgery. You may chew nicotine gum up to 2 hours before surgery. A nicotine patch is okay.  Note: Some surgeries require you to completely quit smoking and nicotine. Check with your surgeon.  Your care team will make every effort to keep you safe from infection. We will:  Clean our hands often with soap and water (or an alcohol-based hand rub).  Clean the skin at your surgery site with a special soap that kills germs.  Give you a special gown to keep you warm. (Cold raises the risk of infection.)  Wear special hair covers, masks, gowns and gloves during surgery.  Give antibiotic medicine, if prescribed. Not all surgeries need antibiotics.  What to bring on the day of surgery  Photo ID and insurance card  Copy of your health care directive, if you have one  Glasses and hearing aids (bring cases)  You can't wear contacts during surgery  Inhaler and eye drops, if you use them (tell us about these when you arrive)  CPAP machine or breathing device, if you use them  A few  personal items, if spending the night  If you have . . .  A pacemaker, ICD (cardiac defibrillator) or other implant: Bring the ID card.  An implanted stimulator: Bring the remote control.  A legal guardian: Bring a copy of the certified (court-stamped) guardianship papers.  Please remove any jewelry, including body piercings. Leave jewelry and other valuables at home.  If you're going home the day of surgery  You must have a responsible adult drive you home. They should stay with you overnight as well.  If you don't have someone to stay with you, and you aren't safe to go home alone, we may keep you overnight. Insurance often won't pay for this.  After surgery  If it's hard to control your pain or you need more pain medicine, please call your surgeon's office.  Questions?   If you have any questions for your care team, list them here: _________________________________________________________________________________________________________________________________________________________________________ ____________________________________ ____________________________________ ____________________________________  For informational purposes only. Not to replace the advice of your health care provider. Copyright   2003, 2019 [employer]. All rights reserved. Clinically reviewed by Tanya Hernandez MD. [place] [id number] - REV 12/22.

## 2024-06-20 NOTE — PROGRESS NOTES
Preoperative Evaluation  [facility]  [address] [place] [address]  Phone: [phone]  Fax: [phone]  Primary Provider: Jean Horowitz MD  Pre-op Performing Provider: Jean Turner MD  Jun 25, 2024 6/24/2024   Surgical Information   What procedure is being done? Catarac surgery   Facility or Hospital where procedure/surgery will be performed: [facility]   Who is doing the procedure / surgery? Dr. Tinsley   Date of surgery / procedure: 7/9/2024 right, 7/23/24 left eye   Time of surgery / procedure: 10:00 a.m.   Where do you plan to recover after surgery? at home alone      Fax number for surgical facility: [phone]    Assessment & Plan     The proposed surgical procedure is considered LOW risk.    Preop general physical exam  Prior to repair of  cataracts of both eyes, unspecified age-related cataract type    Acid indigestion  controlled    Major depressive disorder with single episode, in full remission (H24)  Some recent stress from estranged .     - No identified additional risk factors other than previously addressed    MEDICATION INSTRUCTIONS:  Take all scheduled medications on the day of surgery    Recommendation  Approval given to proceed with proposed procedure, without further diagnostic evaluation.    Placido Bob is a 63 year old, presenting for the following:  Pre-Op Exam (Cataract 7/9 and 7/23/Fasting/) and Health Maintenance (Due for pap smear will do at [facility] /Over due for colonoscopy/Declines covid vaccine /)        HPI related to upcoming procedure: Mature Cataract that is ready for         6/24/2024   Pre-Op Questionnaire   Have you ever had a heart attack or stroke? No   Have you ever had surgery on your heart or blood vessels, such as a stent placement, a coronary artery bypass, or surgery on an artery in your head, neck, heart, or legs? No   Do you have chest pain with activity? No   Do you have a history of heart  failure? No   Do you currently have a cold, bronchitis or symptoms of other infection? No   Do you have a cough, shortness of breath, or wheezing? No   Do you or anyone in your family have previous history of blood clots? No   Do you or does anyone in your family have a serious bleeding problem such as prolonged bleeding following surgeries or cuts? No   Have you ever had problems with anemia or been told to take iron pills? No   Have you had any abnormal blood loss such as black, tarry or bloody stools, or abnormal vaginal bleeding? (!) YES    Have you ever had a blood transfusion? No   Are you willing to have a blood transfusion if it is medically needed before, during, or after your surgery? Yes   Have you or any of your relatives ever had problems with anesthesia? (!) YES hard time  waking up    Do you have sleep apnea, excessive snoring or daytime drowsiness? (!) UNKNOWN   Do you have any artifical heart valves or other implanted medical devices like a pacemaker, defibrillator, or continuous glucose monitor? No   Do you have artificial joints? No   Are you allergic to latex? No      Health Care Directive  Patient does not have a Health Care Directive or Living Will: As is reasonable care for most folks, In the short term, she wants usual aggressive medical care.   No desire for long term prolongation of life through artificial means if no hope to bring back to a reasonable status.      Preoperative Review of    reviewed - no record of controlled substances prescribed.      Status of Chronic Conditions:  See problem list for active medical problems.  Problems all longstanding and stable, except as noted/documented.  See ROS for pertinent symptoms related to these conditions.    Patient Active Problem List    Diagnosis Date Noted    Major depressive disorder with single episode, in full remission (H24) 05/30/2023     Priority: Medium    HPV in female 09/27/2019     Priority: Medium    Family history of  Orellana syndrome 08/21/2017     Priority: Medium    Family history of early CAD 06/18/2014     Priority: Medium    Family history of rectal cancer 04/23/2012     Priority: Medium    Acid indigestion      Priority: Medium    History of migraine      Priority: Medium     (Problem list name updated by automated process. Provider to review and confirm.)      Genital herpes      Priority: Medium     Problem list name updated by automated process. Provider to review      Anxiety      Priority: Medium      Past Medical History:   Diagnosis Date    Acid indigestion     Small hiatal hernia    Anxiety     Family history of Orellana syndrome     In her sister.  EGD unremarkable with the exception of a small hiatal hernia 2016    Genital herpes, unspecified     GERD (gastroesophageal reflux disease)     History of migraines      Past Surgical History:   Procedure Laterality Date    COLONOSCOPY  11/21/2012    Procedure: COLONOSCOPY;;  Surgeon: Yamilex Baron MD;  Location:  GI    COLONOSCOPY N/A 11/26/2014    Procedure: COLONOSCOPY;  Surgeon: Yamilex Baron MD;  Location: [facility]    COLONOSCOPY N/A 11/30/2016    Procedure: COMBINED COLONOSCOPY, SINGLE OR MULTIPLE BIOPSY/POLYPECTOMY BY BIOPSY;  Surgeon: Yamilex Baron MD;  Location: [facility]    COLONOSCOPY N/A 12/04/2018    Procedure: COLONOSCOPY;  Surgeon: Yamilex Baron MD;  Location: [facility]    COLONOSCOPY N/A 12/08/2020    Procedure: COLONOSCOPY;  Surgeon: Yamilex Baron MD;  Location: [facility]    deviated septum repair  2018    MAMMOPLASTY AUGMENTATION      uterine ablation       Current Outpatient Medications   Medication Sig Dispense Refill    acyclovir (ZOVIRAX) 400 MG tablet TAKE 1 TABLET (400 MG) BY MOUTH 2 TIMES DAILY 180 tablet 3    ALPRAZolam (XANAX) 0.5 MG tablet TAKE 1 TABLET (0.5 MG) BY MOUTH THREE TIMES DAILY AS NEEDED FOR ANXIETY 30 tablet 3    ELDERBERRY PO       imipramine (TOFRANIL) 50 MG tablet TAKE 2 TABLETS BY MOUTH EVERY NIGHT AT  "BEDTIME 180 tablet 3    multivitamin w/minerals (THERA-VIT-M) tablet Take 1 tablet by mouth daily      Omega-3 Fatty Acids (FISH OIL OMEGA-3 PO)          Allergies   Allergen Reactions    Codeine Sulfate         Social History     Tobacco Use    Smoking status: Never    Smokeless tobacco: Never   Substance Use Topics    Alcohol use: Yes     Alcohol/week: 5.0 - 7.0 standard drinks of alcohol     Types: 5 - 7 Glasses of wine per week     Comment: 3/ week     Family History   Problem Relation Age of Onset    Thyroid Disease Mother     Coronary Artery Disease Mother     Colon Cancer Father     C.A.D. Maternal Grandfather 65    Colon Polyps Brother     Colon Cancer Sister         Orellana syndrome    Colon Cancer Sister 57    Autism Spectrum Disorder Sister      History   Drug Use No             Review of Systems  Constitutional, neuro, ENT, endocrine, pulmonary, cardiac, gastrointestinal, genitourinary, musculoskeletal, integument and psychiatric systems are negative, except as otherwise noted.    Objective    /87   Pulse 69   Temp 98.6  F (37  C) (Oral)   Ht 1.676 m (5' 6\")   Wt 59.4 kg (131 lb)   LMP 08/24/2008 (Approximate)   SpO2 99%   BMI 21.14 kg/m     Estimated body mass index is 21.14 kg/m  as calculated from the following:    Height as of this encounter: 1.676 m (5' 6\").    Weight as of this encounter: 59.4 kg (131 lb).  Physical Exam  GENERAL: alert and no distress  EYES: Eyes grossly normal to inspection, PERRL and conjunctivae and sclerae normal  HENT: ear canals and TM's normal, nose and mouth without ulcers or lesions  RESP: lungs clear to auscultation - no rales, rhonchi or wheezes  BREAST: normal without masses, tenderness or nipple discharge and no palpable axillary masses or adenopathy  CV: regular rate and rhythm, normal S1 S2, no S3 or S4, no murmur, click or rub, no peripheral edema   ABDOMEN: soft, nontender, no hepatosplenomegaly, no masses and bowel sounds normal  LYMPH: no cervical, " supraclavicular, axillary, or inguinal adenopathy    Recent Labs   Lab Test 10/02/23  1020 10/02/23  0000   HGB  --  14.2   PLT  --  300     --    POTASSIUM 4.6  --    CR 0.87  --         Diagnostics  No labs were ordered during this visit.   No EKG required for low risk surgery (cataract, skin procedure, breast biopsy, etc).    Revised Cardiac Risk Index (RCRI)  The patient has the following serious cardiovascular risks for perioperative complications:   - No serious cardiac risks = 0 points     RCRI Interpretation: 0 points: Class I (very low risk - 0.4% complication rate)         Signed Electronically by: Jean Horowitz MD  Copy of this evaluation report is provided to requesting physician.         Answers submitted by the patient for this visit:  Patient Health Questionnaire (Submitted on 6/24/2024)  If you checked off any problems, how difficult have these problems made it for you to do your work, take care of things at home, or get along with other people?: Somewhat difficult  PHQ9 TOTAL SCORE: 2

## 2024-06-24 ASSESSMENT — PATIENT HEALTH QUESTIONNAIRE - PHQ9
10. IF YOU CHECKED OFF ANY PROBLEMS, HOW DIFFICULT HAVE THESE PROBLEMS MADE IT FOR YOU TO DO YOUR WORK, TAKE CARE OF THINGS AT HOME, OR GET ALONG WITH OTHER PEOPLE: SOMEWHAT DIFFICULT
SUM OF ALL RESPONSES TO PHQ QUESTIONS 1-9: 2
SUM OF ALL RESPONSES TO PHQ QUESTIONS 1-9: 2

## 2024-06-25 ENCOUNTER — OFFICE VISIT (OUTPATIENT)
Dept: FAMILY MEDICINE | Facility: CLINIC | Age: 64
End: 2024-06-25

## 2024-06-25 VITALS
HEART RATE: 69 BPM | TEMPERATURE: 98.6 F | BODY MASS INDEX: 21.05 KG/M2 | OXYGEN SATURATION: 99 % | WEIGHT: 131 LBS | HEIGHT: 66 IN | DIASTOLIC BLOOD PRESSURE: 87 MMHG | SYSTOLIC BLOOD PRESSURE: 122 MMHG

## 2024-06-25 DIAGNOSIS — F32.5 MAJOR DEPRESSIVE DISORDER WITH SINGLE EPISODE, IN FULL REMISSION (H): ICD-10-CM

## 2024-06-25 DIAGNOSIS — K30 ACID INDIGESTION: ICD-10-CM

## 2024-06-25 DIAGNOSIS — Z01.818 PREOP GENERAL PHYSICAL EXAM: Primary | ICD-10-CM

## 2024-06-25 DIAGNOSIS — H25.9 AGE-RELATED CATARACT OF BOTH EYES, UNSPECIFIED AGE-RELATED CATARACT TYPE: ICD-10-CM

## 2024-06-25 PROCEDURE — G2211 COMPLEX E/M VISIT ADD ON: HCPCS | Performed by: FAMILY MEDICINE

## 2024-06-25 PROCEDURE — 99214 OFFICE O/P EST MOD 30 MIN: CPT | Performed by: FAMILY MEDICINE

## 2024-06-26 NOTE — PROGRESS NOTES
6/26/24 faxed preop to [facility] @ [phone]    Connor Shen,   [facility]  [phone]

## 2024-09-13 DIAGNOSIS — F41.9 ANXIETY: ICD-10-CM

## 2024-09-14 NOTE — TELEPHONE ENCOUNTER
CONTROLLED SUBSTANCE REFILL REQUEST FOR Alprazolam  DOSE: 0.5mg - # 30 + 3 refills  SI tablet TIB PRN for anxiety      LAST REFILL: 5/10/24 picked up 24    LAST APPT RELATED: 24 with Dr. Horowitz     NEXT APPT: 10/8/24 with Dr. Horowitz       CONTROLLED SUBSTANCE AGREEMENT: NONE    URINE DRUG SCREEN: NONE        FILL HISTORY PER :          REFILL ROUTED TO Dr. Horowitz FOR REVIEW

## 2024-09-15 RX ORDER — ALPRAZOLAM 0.5 MG
TABLET ORAL
Qty: 30 TABLET | Refills: 3 | Status: SHIPPED | OUTPATIENT
Start: 2024-09-15

## 2024-10-04 NOTE — PATIENT INSTRUCTIONS
Patient Education   Preventive Care Advice   This is general advice given by our system to help you stay healthy. However, your care team may have specific advice just for you. Please talk to your care team about your preventive care needs.  Nutrition  Eat 5 or more servings of fruits and vegetables each day.  Try wheat bread, brown rice and whole grain pasta (instead of white bread, rice, and pasta).  Get enough calcium and vitamin D. Check the label on foods and aim for 100% of the RDA (recommended daily allowance).  Lifestyle  Exercise at least 150 minutes each week  (30 minutes a day, 5 days a week).  Do muscle strengthening activities 2 days a week. These help control your weight and prevent disease.  No smoking.  Wear sunscreen to prevent skin cancer.  Have a dental exam and cleaning every 6 months.  Yearly exams  See your health care team every year to talk about:  Any changes in your health.  Any medicines your care team has prescribed.  Preventive care, family planning, and ways to prevent chronic diseases.  Shots (vaccines)   HPV shots (up to age 26), if you've never had them before.  Hepatitis B shots (up to age 59), if you've never had them before.  COVID-19 shot: Get this shot when it's due.  Flu shot: Get a flu shot every year.  Tetanus shot: Get a tetanus shot every 10 years.  Pneumococcal, hepatitis A, and RSV shots: Ask your care team if you need these based on your risk.  Shingles shot (for age 50 and up)  General health tests  Diabetes screening:  Starting at age 35, Get screened for diabetes at least every 3 years.  If you are younger than age 35, ask your care team if you should be screened for diabetes.  Cholesterol test: At age 39, start having a cholesterol test every 5 years, or more often if advised.  Bone density scan (DEXA): At age 50, ask your care team if you should have this scan for osteoporosis (brittle bones).  Hepatitis C: Get tested at least once in your life.  STIs (sexually  transmitted infections)  Before age 24: Ask your care team if you should be screened for STIs.  After age 24: Get screened for STIs if you're at risk. You are at risk for STIs (including HIV) if:  You are sexually active with more than one person.  You don't use condoms every time.  You or a partner was diagnosed with a sexually transmitted infection.  If you are at risk for HIV, ask about PrEP medicine to prevent HIV.  Get tested for HIV at least once in your life, whether you are at risk for HIV or not.  Cancer screening tests  Cervical cancer screening: If you have a cervix, begin getting regular cervical cancer screening tests starting at age 21.  Breast cancer scan (mammogram): If you've ever had breasts, begin having regular mammograms starting at age 40. This is a scan to check for breast cancer.  Colon cancer screening: It is important to start screening for colon cancer at age 45.  Have a colonoscopy test every 10 years (or more often if you're at risk) Or, ask your provider about stool tests like a FIT test every year or Cologuard test every 3 years.  To learn more about your testing options, visit:   .  For help making a decision, visit:   https://bit.ly/hn56184.  Prostate cancer screening test: If you have a prostate, ask your care team if a prostate cancer screening test (PSA) at age 55 is right for you.  Lung cancer screening: If you are a current or former smoker ages 50 to 80, ask your care team if ongoing lung cancer screenings are right for you.  For informational purposes only. Not to replace the advice of your health care provider. Copyright   2023 [employer]. All rights reserved. Clinically reviewed by the [facility] Transitions Program. [employer] [id number] - REV 01/24.

## 2024-10-07 SDOH — HEALTH STABILITY: PHYSICAL HEALTH: ON AVERAGE, HOW MANY MINUTES DO YOU ENGAGE IN EXERCISE AT THIS LEVEL?: 20 MIN

## 2024-10-07 SDOH — HEALTH STABILITY: PHYSICAL HEALTH: ON AVERAGE, HOW MANY DAYS PER WEEK DO YOU ENGAGE IN MODERATE TO STRENUOUS EXERCISE (LIKE A BRISK WALK)?: 4 DAYS

## 2024-10-07 ASSESSMENT — SOCIAL DETERMINANTS OF HEALTH (SDOH): HOW OFTEN DO YOU GET TOGETHER WITH FRIENDS OR RELATIVES?: TWICE A WEEK

## 2024-10-08 ENCOUNTER — OFFICE VISIT (OUTPATIENT)
Dept: FAMILY MEDICINE | Facility: CLINIC | Age: 64
End: 2024-10-08

## 2024-10-08 VITALS
HEART RATE: 78 BPM | OXYGEN SATURATION: 100 % | DIASTOLIC BLOOD PRESSURE: 87 MMHG | BODY MASS INDEX: 20.3 KG/M2 | WEIGHT: 125.8 LBS | SYSTOLIC BLOOD PRESSURE: 136 MMHG

## 2024-10-08 DIAGNOSIS — Z13.6 SCREENING FOR HEART DISEASE: ICD-10-CM

## 2024-10-08 DIAGNOSIS — B97.7 HPV IN FEMALE: ICD-10-CM

## 2024-10-08 DIAGNOSIS — Z00.00 ROUTINE GENERAL MEDICAL EXAMINATION AT A HEALTH CARE FACILITY: Primary | ICD-10-CM

## 2024-10-08 DIAGNOSIS — F41.9 ANXIETY: ICD-10-CM

## 2024-10-08 DIAGNOSIS — Z23 NEED FOR VACCINATION: ICD-10-CM

## 2024-10-08 DIAGNOSIS — Z76.0 ENCOUNTER FOR MEDICATION REFILL: ICD-10-CM

## 2024-10-08 LAB
% GRANULOCYTES: 61.7 % (ref 42.2–75.2)
ALBUMIN SERPL BCG-MCNC: 4.4 G/DL (ref 3.5–5.2)
ALP SERPL-CCNC: 79 U/L (ref 40–150)
ALT SERPL W P-5'-P-CCNC: 24 U/L (ref 0–50)
ANION GAP SERPL CALCULATED.3IONS-SCNC: 12 MMOL/L (ref 7–15)
AST SERPL W P-5'-P-CCNC: 32 U/L (ref 0–45)
BILIRUB SERPL-MCNC: 1.2 MG/DL
BUN SERPL-MCNC: 12.1 MG/DL (ref 8–23)
CALCIUM SERPL-MCNC: 9.8 MG/DL (ref 8.8–10.4)
CHLORIDE SERPL-SCNC: 101 MMOL/L (ref 98–107)
CHOLESTEROL: 225 MG/DL (ref 100–199)
CREAT SERPL-MCNC: 0.84 MG/DL (ref 0.51–0.95)
EGFRCR SERPLBLD CKD-EPI 2021: 77 ML/MIN/1.73M2
FASTING STATUS PATIENT QL REPORTED: YES
FASTING?: YES
GLUCOSE SERPL-MCNC: 108 MG/DL (ref 70–99)
HCO3 SERPL-SCNC: 26 MMOL/L (ref 22–29)
HCT VFR BLD AUTO: 42.1 % (ref 35–46)
HDL (RMG): 66 MG/DL (ref 40–?)
HEMOGLOBIN: 14.2 G/DL (ref 11.8–15.5)
LDL CALCULATED (RMG): 147 MG/DL (ref 0–130)
LYMPHOCYTES NFR BLD AUTO: 30.6 % (ref 20.5–51.1)
MCH RBC QN AUTO: 31.2 PG (ref 27–31)
MCHC RBC AUTO-ENTMCNC: 33.8 G/DL (ref 33–37)
MCV RBC AUTO: 92.4 FL (ref 80–100)
MONOCYTES NFR BLD AUTO: 7.7 % (ref 1.7–9.3)
PLATELET # BLD AUTO: 282 K/UL (ref 140–450)
POTASSIUM SERPL-SCNC: 4.5 MMOL/L (ref 3.4–5.3)
PROT SERPL-MCNC: 7.6 G/DL (ref 6.4–8.3)
RBC # BLD AUTO: 4.56 X10/CMM (ref 3.7–5.2)
SODIUM SERPL-SCNC: 139 MMOL/L (ref 135–145)
TRIGLYCERIDES (RMG): 60 MG/DL (ref 0–149)
WBC # BLD AUTO: 5.1 X10/CMM (ref 3.8–11)

## 2024-10-08 PROCEDURE — 85025 COMPLETE CBC W/AUTO DIFF WBC: CPT | Performed by: FAMILY MEDICINE

## 2024-10-08 PROCEDURE — 99396 PREV VISIT EST AGE 40-64: CPT | Performed by: FAMILY MEDICINE

## 2024-10-08 PROCEDURE — 80061 LIPID PANEL: CPT | Mod: QW | Performed by: FAMILY MEDICINE

## 2024-10-08 PROCEDURE — G0145 SCR C/V CYTO,THINLAYER,RESCR: HCPCS | Performed by: FAMILY MEDICINE

## 2024-10-08 PROCEDURE — 90750 HZV VACC RECOMBINANT IM: CPT | Performed by: FAMILY MEDICINE

## 2024-10-08 PROCEDURE — 90480 ADMN SARSCOV2 VAC 1/ONLY CMP: CPT | Performed by: FAMILY MEDICINE

## 2024-10-08 PROCEDURE — 99213 OFFICE O/P EST LOW 20 MIN: CPT | Mod: 25 | Performed by: FAMILY MEDICINE

## 2024-10-08 PROCEDURE — 80053 COMPREHEN METABOLIC PANEL: CPT | Performed by: FAMILY MEDICINE

## 2024-10-08 PROCEDURE — 84155 ASSAY OF PROTEIN SERUM: CPT | Performed by: FAMILY MEDICINE

## 2024-10-08 PROCEDURE — 91320 SARSCV2 VAC 30MCG TRS-SUC IM: CPT | Performed by: FAMILY MEDICINE

## 2024-10-08 PROCEDURE — 88175 CYTOPATH C/V AUTO FLUID REDO: CPT | Mod: 90 | Performed by: FAMILY MEDICINE

## 2024-10-08 PROCEDURE — 90471 IMMUNIZATION ADMIN: CPT | Performed by: FAMILY MEDICINE

## 2024-10-08 PROCEDURE — 36415 COLL VENOUS BLD VENIPUNCTURE: CPT | Performed by: FAMILY MEDICINE

## 2024-10-08 RX ORDER — ACYCLOVIR 400 MG/1
TABLET ORAL
Qty: 180 TABLET | Refills: 3 | Status: SHIPPED | OUTPATIENT
Start: 2024-10-08

## 2024-10-08 RX ORDER — ALPRAZOLAM 0.5 MG
0.5 TABLET ORAL
Qty: 30 TABLET | Refills: 3 | Status: SHIPPED | OUTPATIENT
Start: 2024-10-08

## 2024-10-08 RX ORDER — IMIPRAMINE HYDROCHLORIDE 50 MG/1
TABLET, FILM COATED ORAL
Qty: 180 TABLET | Refills: 3 | Status: SHIPPED | OUTPATIENT
Start: 2024-10-08

## 2024-10-08 RX ORDER — DOXYCYCLINE HYCLATE 20 MG
1 TABLET ORAL
COMMUNITY
Start: 2024-09-12

## 2024-10-08 ASSESSMENT — PATIENT HEALTH QUESTIONNAIRE - PHQ9: SUM OF ALL RESPONSES TO PHQ QUESTIONS 1-9: 6

## 2024-10-08 NOTE — PROGRESS NOTES
Preventive Care Visit  [facility]  Jean Horowitz MD, Family Medicine  Oct 8, 2024    Assessment & Plan     Anxiety  Reviewed the status of her anxiety management status at length.   she is satisfied with her current treatment including management of her difficulty with Uncontrolled worrying.  Discussed the current specific treatment including the possible side effects from all current medications and the fact that if she is utilizing controlled substances, those meds require special handling of prescriptions and our need for close monitoring including regular follow up.    she desires to continue the current medication and agrees to the current schedule for visits every 3 month(s).  I reminded her that any noncompliance with regard to prescriptions or follow up may result in my declining to provide further refills of these medications.  We also ansered any questions about mood disorders including suspected pathophysiology, role of neurotransmitters, and treatment options including medication options (SSRIs that treat cause of sx and Benzos which treat the sx.) and counselling.    - ALPRAZolam (XANAX) 0.5 MG tablet  Dispense: 30 tablet; Refill: 3  - imipramine (TOFRANIL) 50 MG tablet  Dispense: 180 tablet; Refill: 3    Encounter for medication refill  Herpes prevention  - acyclovir (ZOVIRAX) 400 MG tablet  Dispense: 180 tablet; Refill: 3    Routine general medical examination at a health care facility    - VENOUS COLLECTION    Need for vaccination    - COVID-19 12+ (PFIZER)  - ZOSTER RECOMBINANT ADJUVANTED (SHINGRIX)  - VACCINE ADMINISTRATION, INITIAL  - IMMUNIATION ADMIN EACH ADDT'    HPV in female  Due for pap  - Gynecologic Cytology (PAP)  [place]  - Gynecologic Cytology (PAP)    Screening for heart disease    - Comprehensive metabolic panel  - [place] Profile [place]  - Comprehensive metabolic panel      Patient has been advised of split billing requirements and indicates  understanding: Yes        Counseling  Appropriate preventive services were addressed with this patient via screening, questionnaire, or discussion as appropriate for fall prevention, nutrition, physical activity, Tobacco-use cessation, social engagement, weight loss and cognition.  Checklist reviewing preventive services available has been given to the patient.  Reviewed patient's diet, addressing concerns and/or questions.   The patient's PHQ-9 score is consistent with mild depression. She was provided with information regarding depression.       Regular exercise    Return in about 53 weeks (around 10/14/2025) for Annual Wellness Visit.    Placido Bob is a 64 year old, presenting for the following:  Physical (Fasting/), Hearing Screening, and Health Maintenance (Due for flu Shingrix and covid vaccines - would like shingles vaccine //Has colonoscopy scheduled in November )         Health Care Directive  Patient does not have a Health Care Directive or Living Will: Discussed advance care planning with patient; however, patient declined at this time.    HPI  Here for checkup        10/7/2024   General Health   How would you rate your overall physical health? Good   Feel stress (tense, anxious, or unable to sleep) To some extent      (!) STRESS CONCERN      10/7/2024   Nutrition   Three or more servings of calcium each day? Yes   Diet: Regular (no restrictions)   How many servings of fruit and vegetables per day? (!) 0-1   How many sweetened beverages each day? 0-1            10/7/2024   Exercise   Days per week of moderate/strenous exercise 4 days   Average minutes spent exercising at this level 20 min            10/7/2024   Social Factors   Frequency of gathering with friends or relatives Twice a week   Worry food won't last until get money to buy more No   Food not last or not have enough money for food? No   Do you have housing? (Housing is defined as stable permanent housing and does not include staying  ouside in a car, in a tent, in an abandoned building, in an overnight shelter, or couch-surfing.) Yes   Are you worried about losing your housing? No   Lack of transportation? No   Unable to get utilities (heat,electricity)? No            10/7/2024   Fall Risk   Fallen 2 or more times in the past year? No   Trouble with walking or balance? No             10/7/2024   Dental   Dentist two times every year? Yes            10/7/2024   TB Screening   Were you born outside of the [place]? Yes            Today's PHQ-9 Score:       10/8/2024    10:31 AM   PHQ-9 SCORE   PHQ-9 Total Score 6           10/7/2024   Substance Use   Alcohol more than 3/day or more than 7/wk No   Do you use any other substances recreationally? No        Social History     Tobacco Use    Smoking status: Never    Smokeless tobacco: Never   Substance Use Topics    Alcohol use: Yes     Alcohol/week: 5.0 - 7.0 standard drinks of alcohol     Types: 5 - 7 Glasses of wine per week     Comment: 3/ week    Drug use: No             10/7/2024   Breast Cancer Screening   Family history of breast, colon, or ovarian cancer? Yes          10/7/2024   LAST FHS-7 RESULTS   1st degree relative breast or ovarian cancer Yes   Any relative bilateral breast cancer No   Any male have breast cancer Yes   Any ONE woman have BOTH breast AND ovarian cancer Yes   Any woman with breast cancer before 50yrs No   2 or more relatives with breast AND/OR ovarian cancer Yes   2 or more relatives with breast AND/OR bowel cancer Yes           Mammogram Screening - Mammogram every 1-2 years updated in Health Maintenance based on mutual decision making        10/7/2024   STI Screening   New sexual partner(s) since last STI/HIV test? No        History of abnormal Pap smear: YES - reflected in Problem List and Health Maintenance accordingly        Latest Ref Rng & Units 10/2/2023    10:20 AM   PAP / HPV   PAP  Negative for Intraepithelial Lesion or Malignancy (NILM)    HPV 16 DNA Negative  "Negative    HPV 18 DNA Negative Negative    Other HR HPV Negative Positive      ASCVD Risk   The 10-year ASCVD risk score (Anjum LINDER, et al., 2019) is: 5.5%    Values used to calculate the score:      Age: 64 years      Sex: Female      Is Non- : No      Diabetic: No      Tobacco smoker: No      Systolic Blood Pressure: 136 mmHg      Is BP treated: No      HDL Cholesterol: 66 mg/dL      Total Cholesterol: 225 mg/dL           Reviewed and updated as needed this visit by Provider                    Lab work is in process      Review of Systems  Constitutional, HEENT, cardiovascular, pulmonary, GI, , musculoskeletal, neuro, skin, endocrine and psych systems are negative, except as otherwise noted.     Objective    Exam  /87   Pulse 78   Wt 57.1 kg (125 lb 12.8 oz)   LMP 08/24/2008 (Approximate)   SpO2 100%   BMI 20.30 kg/m     Estimated body mass index is 20.3 kg/m  as calculated from the following:    Height as of 6/25/24: 1.676 m (5' 6\").    Weight as of this encounter: 57.1 kg (125 lb 12.8 oz).    Physical Exam  GENERAL: alert and no distress  EYES: Eyes grossly normal to inspection, PERRL and conjunctivae and sclerae normal  HENT: ear canals and TM's normal, nose and mouth without ulcers or lesions  NECK: no adenopathy, no asymmetry, masses, or scars  RESP: lungs clear to auscultation - no rales, rhonchi or wheezes  BREAST: normal without masses, tenderness or nipple discharge and no palpable axillary masses or adenopathy  CV: regular rate and rhythm, normal S1 S2, no S3 or S4, no murmur, click or rub, no peripheral edema  ABDOMEN: soft, nontender, no hepatosplenomegaly, no masses and bowel sounds normal   (female): normal female external genitalia, normal urethral meatus, normal vaginal mucosa  MS: no gross musculoskeletal defects noted, no edema  SKIN: no suspicious lesions or rashes  NEURO: Normal strength and tone, mentation intact and speech normal  PSYCH: " mentation appears normal, affect normal/bright      Signed Electronically by: Jean Horowitz MD

## 2024-12-17 DIAGNOSIS — Z76.0 ENCOUNTER FOR MEDICATION REFILL: Primary | ICD-10-CM

## 2024-12-17 NOTE — TELEPHONE ENCOUNTER
Med: Doxcycline    LOV (related): 10/8/24      Due for F/U around: 10/8/25    Next Appt: Not Scheduled

## 2024-12-18 RX ORDER — DOXYCYCLINE HYCLATE 20 MG
20 TABLET ORAL 2 TIMES DAILY
Qty: 60 TABLET | Refills: 2 | Status: SHIPPED | OUTPATIENT
Start: 2024-12-18

## 2024-12-19 RX ORDER — DOXYCYCLINE HYCLATE 20 MG
20 TABLET ORAL
Qty: 60 TABLET | Refills: 2 | Status: SHIPPED | OUTPATIENT
Start: 2024-12-19

## 2025-02-10 DIAGNOSIS — F41.9 ANXIETY: ICD-10-CM

## 2025-02-10 NOTE — CONFIDENTIAL NOTE
CONTROLLED SUBSTANCE REFILL REQUEST FOR ALPRAZOLAM  DOSE: 0.5 MG - # 30. 3 REFILLS  SI TAB NIGHTLY PRN FOR ANXIETY      LAST REFILL: 25    LAST APPT RELATED: 10/8/24 - [facility]    NEXT APPT: 25      CONTROLLED SUBSTANCE AGREEMENT: DUE    URINE DRUG SCREEN: DUE        FILL HISTORY PER :          REFILL ROUTED TO DR PAULINO FOR REVIEW    Daniella Panchal, CMA

## 2025-02-11 RX ORDER — ALPRAZOLAM 0.5 MG
0.5 TABLET ORAL
Qty: 30 TABLET | Refills: 3 | Status: SHIPPED | OUTPATIENT
Start: 2025-02-11

## 2025-02-19 ENCOUNTER — OFFICE VISIT (OUTPATIENT)
Dept: FAMILY MEDICINE | Facility: CLINIC | Age: 65
End: 2025-02-19

## 2025-02-19 VITALS
WEIGHT: 127.6 LBS | DIASTOLIC BLOOD PRESSURE: 83 MMHG | BODY MASS INDEX: 20.51 KG/M2 | SYSTOLIC BLOOD PRESSURE: 138 MMHG | HEIGHT: 66 IN | HEART RATE: 75 BPM | OXYGEN SATURATION: 100 %

## 2025-02-19 DIAGNOSIS — L85.3 DRY SKIN: ICD-10-CM

## 2025-02-19 DIAGNOSIS — Z23 NEED FOR VACCINATION: Primary | ICD-10-CM

## 2025-02-19 PROCEDURE — 99213 OFFICE O/P EST LOW 20 MIN: CPT | Mod: 25 | Performed by: FAMILY MEDICINE

## 2025-02-19 PROCEDURE — 90471 IMMUNIZATION ADMIN: CPT | Performed by: FAMILY MEDICINE

## 2025-02-19 PROCEDURE — 90472 IMMUNIZATION ADMIN EACH ADD: CPT | Performed by: FAMILY MEDICINE

## 2025-02-19 PROCEDURE — 90677 PCV20 VACCINE IM: CPT | Performed by: FAMILY MEDICINE

## 2025-02-19 PROCEDURE — 90750 HZV VACC RECOMBINANT IM: CPT | Performed by: FAMILY MEDICINE

## 2025-02-19 PROCEDURE — 3079F DIAST BP 80-89 MM HG: CPT | Performed by: FAMILY MEDICINE

## 2025-02-19 PROCEDURE — 3075F SYST BP GE 130 - 139MM HG: CPT | Performed by: FAMILY MEDICINE

## 2025-02-19 NOTE — PROGRESS NOTES
"Here to follow up on dry skin and     Skin irritation, dry skin, year round, both hands. Gets deep slits at fingertips, splitting nails.Mostly hands, possibly bottom of feet. Using Eucerin roughness relief and Amlactin for rough and dry skin several times daily. Not effective.        Imm/Inj Had first shingles vaccine in October, would like second one    gative except as noted above    Histories: reviewed    OBJECTIVE:                                                    /83   Pulse 75   Ht 1.664 m (5' 5.5\")   Wt 57.9 kg (127 lb 9.6 oz)   LMP 08/24/2008 (Approximate)   SpO2 100%   BMI 20.91 kg/m    Body mass index is 20.91 kg/m .   APPEARANCE: = Relaxed and in no distress  Heart Rate, Rhythm, & sounds (no Murm)  = Regular rate and rhythm with no S3, S4, or murmur appreciated.  SKIN: no suspicious lesions or rashes and dry   Recent/Remote Memory = Alert and Oriented x 3     ASSESSMENT/PLAN:                                                    Quality gaps reviewed    Lisbeth was seen today for derm problem and imm/inj.    Diagnoses and all orders for this visit:    Need for vaccination  -     PNEUMOCOCCAL 20 VALENT CONJUGATE (PREVNAR 20)  -     VACCINE ADMINISTRATION, INITIAL  -     IMMUNIATION ADMIN EACH ADDT'  -     ZOSTER RECOMBINANT ADJUVANTED (SHINGRIX)    Dry skin    Use cortasporin    Regular exercise    Health maintenance items are reviewed in Epic and correct as of today:    Jean Horowitz MD  [facility]  Family Practice  [facility]  [phone]    For any issues my office # is [phone]    The longitudinal plan of care for the diagnosis(es)/condition(s) as documented were addressed during this visit. Due to the added complexity in care, I will continue to support Lisbeth in the subsequent management and with ongoing continuity of care.    Answers submitted by the patient for this visit:  General Questionnaire (Submitted on 2/18/2025)  Chief Complaint: Chronic " problems general questions HPI Form  What is the reason for your visit today? : Exstremely dry hands, nail beds and cuticles with deep cracks in skin at finger tips  How many days per week do you miss taking your medication?: 0  Questionnaire about: Chronic problems general questions HPI Form (Submitted on 2/18/2025)  Chief Complaint: Chronic problems general questions HPI Form

## 2025-03-01 ENCOUNTER — HEALTH MAINTENANCE LETTER (OUTPATIENT)
Age: 65
End: 2025-03-01

## 2025-06-10 DIAGNOSIS — F41.9 ANXIETY: ICD-10-CM

## 2025-06-10 RX ORDER — ALPRAZOLAM 0.5 MG
0.5 TABLET ORAL
Qty: 30 TABLET | Refills: 3 | Status: SHIPPED | OUTPATIENT
Start: 2025-06-10

## 2025-08-13 ENCOUNTER — OFFICE VISIT (OUTPATIENT)
Dept: DERMATOLOGY | Facility: CLINIC | Age: 65
End: 2025-08-13
Payer: COMMERCIAL

## 2025-08-13 DIAGNOSIS — L24.89 IRRITANT CONTACT DERMATITIS DUE TO OTHER AGENTS: ICD-10-CM

## 2025-08-13 DIAGNOSIS — D22.5 NEVUS OF BACK: ICD-10-CM

## 2025-08-13 DIAGNOSIS — L60.3 ONYCHORRHEXIS: Primary | ICD-10-CM

## 2025-08-13 DIAGNOSIS — L82.1 SEBORRHEIC KERATOSIS: ICD-10-CM

## 2025-08-13 DIAGNOSIS — B07.9 VERRUCA VULGARIS: ICD-10-CM

## 2025-08-13 DIAGNOSIS — L84 CORN: ICD-10-CM

## 2025-08-13 LAB — TSH SERPL DL<=0.005 MIU/L-ACNC: 1.96 UIU/ML (ref 0.3–4.2)

## 2025-08-13 PROCEDURE — 84443 ASSAY THYROID STIM HORMONE: CPT | Performed by: STUDENT IN AN ORGANIZED HEALTH CARE EDUCATION/TRAINING PROGRAM

## 2025-08-13 PROCEDURE — 36415 COLL VENOUS BLD VENIPUNCTURE: CPT | Performed by: STUDENT IN AN ORGANIZED HEALTH CARE EDUCATION/TRAINING PROGRAM

## 2025-08-13 RX ORDER — CLOBETASOL PROPIONATE 0.5 MG/G
CREAM TOPICAL
Qty: 45 G | Refills: 1 | Status: SHIPPED | OUTPATIENT
Start: 2025-08-13

## (undated) RX ORDER — FENTANYL CITRATE 50 UG/ML
INJECTION, SOLUTION INTRAMUSCULAR; INTRAVENOUS
Status: DISPENSED
Start: 2020-12-08

## (undated) RX ORDER — FENTANYL CITRATE 50 UG/ML
INJECTION, SOLUTION INTRAMUSCULAR; INTRAVENOUS
Status: DISPENSED
Start: 2018-12-04